# Patient Record
Sex: FEMALE | Race: WHITE | NOT HISPANIC OR LATINO | Employment: OTHER | URBAN - METROPOLITAN AREA
[De-identification: names, ages, dates, MRNs, and addresses within clinical notes are randomized per-mention and may not be internally consistent; named-entity substitution may affect disease eponyms.]

---

## 2017-06-02 ENCOUNTER — OFFICE VISIT (OUTPATIENT)
Dept: URGENT CARE | Facility: CLINIC | Age: 42
End: 2017-06-02
Payer: COMMERCIAL

## 2017-06-02 VITALS
OXYGEN SATURATION: 99 % | BODY MASS INDEX: 22.87 KG/M2 | HEIGHT: 69 IN | TEMPERATURE: 98.1 F | SYSTOLIC BLOOD PRESSURE: 118 MMHG | RESPIRATION RATE: 16 BRPM | HEART RATE: 72 BPM | WEIGHT: 154.4 LBS | DIASTOLIC BLOOD PRESSURE: 76 MMHG

## 2017-06-02 DIAGNOSIS — S23.41XA RIB SPRAIN, INITIAL ENCOUNTER: ICD-10-CM

## 2017-06-02 PROCEDURE — 99214 OFFICE O/P EST MOD 30 MIN: CPT | Performed by: FAMILY MEDICINE

## 2017-06-02 RX ORDER — KETOROLAC TROMETHAMINE 30 MG/ML
60 INJECTION, SOLUTION INTRAMUSCULAR; INTRAVENOUS ONCE
Status: COMPLETED | OUTPATIENT
Start: 2017-06-02 | End: 2017-06-02

## 2017-06-02 RX ORDER — CYCLOBENZAPRINE HCL 10 MG
10 TABLET ORAL 2 TIMES DAILY PRN
Qty: 14 TAB | Refills: 0 | Status: SHIPPED | OUTPATIENT
Start: 2017-06-02 | End: 2019-03-04

## 2017-06-02 RX ADMIN — KETOROLAC TROMETHAMINE 60 MG: 30 INJECTION, SOLUTION INTRAMUSCULAR; INTRAVENOUS at 09:31

## 2017-06-02 ASSESSMENT — ENCOUNTER SYMPTOMS
VOMITING: 0
NAUSEA: 1
FEVER: 0
CHILLS: 0

## 2017-06-02 NOTE — MR AVS SNAPSHOT
"        Jenelle Clark   2017 8:45 AM   Office Visit   MRN: [id number]    Department:  [facility]   Dept Phone:  [phone]    Description:  Female : 1975   Provider:  Hai Connor M.D.           Reason for Visit     Rib Injury x 2 days ago started having pain in R ribs, hurts to breathe, no known injury       Allergies as of 2017     No Known Allergies      You were diagnosed with     Rib sprain, initial encounter   [[id number]]         Vital Signs     Blood Pressure Pulse Temperature Respirations Height Weight    118/76 mmHg 72 36.7 °C (98.1 °F) 16 1.753 m (5' 9\") 70.035 kg (154 lb 6.4 oz)    Body Mass Index Oxygen Saturation Smoking Status             22.79 kg/m2 99% Never Smoker          Basic Information     Date Of Birth Sex Race Ethnicity Preferred Language    1975 Female White Non- English      Health Maintenance        Date Due Completion Dates    IMM DTaP/Tdap/Td Vaccine (1 - Tdap) 10/8/1992 ---    PAP SMEAR 10/8/1994 ---    MAMMOGRAM 10/8/2013 ---            Current Immunizations     No immunizations on file.      Below and/or attached are the medications your provider expects you to take. Review all of your home medications and newly ordered medications with your provider and/or pharmacist. Follow medication instructions as directed by your provider and/or pharmacist. Please keep your medication list with you and share with your provider. Update the information when medications are discontinued, doses are changed, or new medications (including over-the-counter products) are added; and carry medication information at all times in the event of emergency situations     Allergies:  No Known Allergies          Medications  Valid as of: 2017 -  9:42 AM    Generic Name Brand Name Tablet Size Instructions for use    Cyclobenzaprine HCl (Tab) FLEXERIL 10 MG Take 1 Tab by mouth 2 times a day as needed. May cause drowsiness (do not operate heavy machinery).       " Ibuprofen (Tab) MOTRIN 200 MG Take 200 mg by mouth every 6 hours as needed.        .                 Medicines prescribed today were sent to:     [id number] - [place], [place] - [address] [facility]    [address] [facility] [address]    Phone: [phone] Fax: [phone]    Open 24 Hours?: No      Medication refill instructions:       If your prescription bottle indicates you have medication refills left, it is not necessary to call your provider’s office. Please contact your pharmacy and they will refill your medication.    If your prescription bottle indicates you do not have any refills left, you may request refills at any time through one of the following ways: The online Scyron system (except Urgent Care), by calling your provider’s office, or by asking your pharmacy to contact your provider’s office with a refill request. Medication refills are processed only during regular business hours and may not be available until the next business day. Your provider may request additional information or to have a follow-up visit with you prior to refilling your medication.   *Please Note: Medication refills are assigned a new Rx number when refilled electronically. Your pharmacy may indicate that no refills were authorized even though a new prescription for the same medication is available at the pharmacy. Please request the medicine by name with the pharmacy before contacting your provider for a refill.           Scyron Access Code: [email or web address]  Expires: 7/2/2017  9:42 AM    Your email address is not on file at [facility].  Email Addresses are required for you to sign up for Scyron, please contact [phone] to verify your personal information and to provide your email address prior to attempting to register for Scyron.    Jenelle Clark  [facility] [place] [address]    Scyron  A secure, online tool to manage your health information     [employer]’s Scyron® is a secure, online tool  that connects you to your personalized health information from the privacy of your home -- day or night - making it very easy for you to manage your healthcare. Once the activation process is completed, you can even access your medical information using the ngmoco carly, which is available for free in the Apple Carly store or Google Play store.     To learn more about ngmoco, visit [email or web address]    There are two levels of access available (as shown below):   My Chart Features  [facility] Doctor [facility]  Specialists [facility]  Urgent  Care Non-[facility] Primary [facility] Doctor   Email your healthcare team securely and privately 24/7 X X X    Manage appointments: schedule your next appointment; view details of past/upcoming appointments X      Request prescription refills. X      View recent personal medical records, including lab and immunizations X X X X   View health record, including health history, allergies, medications X X X X   Read reports about your outpatient visits, procedures, consult and ER notes X X X X   See your discharge summary, which is a recap of your hospital and/or ER visit that includes your diagnosis, lab results, and care plan X X  X     How to register for ngmoco:  Once your e-mail address has been verified, follow the following steps to sign up for ngmoco.     1. Go to  [email or web address]  2. Click on the Sign Up Now box, which takes you to the New Member Sign Up page. You will need to provide the following information:  a. Enter your ngmoco Access Code exactly as it appears at the top of this page. (You will not need to use this code after you’ve completed the sign-up process. If you do not sign up before the expiration date, you must request a new code.)   b. Enter your date of birth.   c. Enter your home email address.   d. Click Submit, and follow the next screen’s instructions.  3. Create a ngmoco ID. This will be your ngmoco login ID and cannot be changed, so think  of one that is secure and easy to remember.  4. Create a Savor[employer] password. You can change your password at any time.  5. Enter your Password Reset Question and Answer. This can be used at a later time if you forget your password.   6. Enter your e-mail address. This allows you to receive e-mail notifications when new information is available in Savor[employer].  7. Click Sign Up. You can now view your health information.    For assistance activating your [employer] account, call [phone]

## 2017-06-02 NOTE — PROGRESS NOTES
"Subjective:      Jenelle Clark is a 41 y.o. female who presents with Rib Injury            Rib Injury  This is a new problem. The current episode started in the past 7 days (3 days). The problem occurs constantly. The problem has been gradually worsening. Associated symptoms include nausea. Pertinent negatives include no chest pain, chills, fever or vomiting. Associated symptoms comments: Occasional nausea with bad pain. Exacerbated by: deep breathing and rotation, laying on right side. She has tried ice and NSAIDs for the symptoms. The treatment provided mild relief.       Review of Systems   Constitutional: Negative for fever and chills.   Cardiovascular: Negative for chest pain.   Gastrointestinal: Positive for nausea. Negative for vomiting.     PMH:  has no past medical history on file.  MEDS:   Current outpatient prescriptions:   •  ibuprofen (MOTRIN) 200 MG Tab, Take 200 mg by mouth every 6 hours as needed., Disp: , Rfl:   •  Carisoprodol (SOMA) 250 MG Tab, Take 1 Tab by mouth 3 times a day as needed., Disp: 20 Tab, Rfl: 0  ALLERGIES: No Known Allergies  SURGHX:   Past Surgical History   Procedure Laterality Date   • Hip arthroscopy Right    • Cardiovascular       cardiopulmonary atresia as an infant     SOCHX:  reports that she has never smoked. She does not have any smokeless tobacco history on file. She reports that she drinks alcohol. She reports that she does not use illicit drugs.  FH: Family history was reviewed, no pertinent findings to report       Objective:     /76 mmHg  Pulse 72  Temp(Src) 36.7 °C (98.1 °F)  Resp 16  Ht 1.753 m (5' 9\")  Wt 70.035 kg (154 lb 6.4 oz)  BMI 22.79 kg/m2  SpO2 99%     Physical Exam   Constitutional: She appears well-developed. No distress.   HENT:   Head: Normocephalic.   Cardiovascular: Normal rate and regular rhythm.  Exam reveals no friction rub.    Murmur heard.  Machinery murmur   Pulmonary/Chest: Effort normal. No respiratory distress. She has no " wheezes.   Abdominal: Soft. She exhibits no distension. There is no tenderness. There is no rebound and no guarding.   No organomegaly   Neurological: She is alert.   Skin: Skin is dry. No rash noted. She is not diaphoretic.   Psychiatric: She has a normal mood and affect. Her behavior is normal.     No ecchymosis  No rib deformity or crepitus noted  POSITIVE soft tissue tenderness along the eighth and ninth rib region posteriorly with some muscle spasm  Pressure deep inspiration reproduces the pain both posteriorly and anteriorly with combined pressure producing relief           Assessment/Plan:     1. Rib sprain, initial encounter  ketorolac (TORADOL) injection 60 mg    cyclobenzaprine (FLEXERIL) 10 MG Tab     Muscle spasm/rib pain  No acute injury  Slow onset  Anti-inflammatory OTC to begin taking tonight  Cyclobenzaprine at bedtime  Follow-up if symptoms worsen or fail to improve

## 2017-12-13 ENCOUNTER — HOSPITAL ENCOUNTER (OUTPATIENT)
Dept: LAB | Facility: MEDICAL CENTER | Age: 42
End: 2017-12-13
Attending: NURSE PRACTITIONER
Payer: COMMERCIAL

## 2017-12-13 LAB
25(OH)D3 SERPL-MCNC: 34 NG/ML (ref 30–100)
ALBUMIN SERPL BCP-MCNC: 4 G/DL (ref 3.2–4.9)
ALBUMIN/GLOB SERPL: 1.6 G/DL
ALP SERPL-CCNC: 54 U/L (ref 30–99)
ALT SERPL-CCNC: 15 U/L (ref 2–50)
ANION GAP SERPL CALC-SCNC: 6 MMOL/L (ref 0–11.9)
AST SERPL-CCNC: 16 U/L (ref 12–45)
BASOPHILS # BLD AUTO: 0.3 % (ref 0–1.8)
BASOPHILS # BLD: 0.02 K/UL (ref 0–0.12)
BILIRUB SERPL-MCNC: 0.4 MG/DL (ref 0.1–1.5)
BUN SERPL-MCNC: 16 MG/DL (ref 8–22)
CALCIUM SERPL-MCNC: 9.1 MG/DL (ref 8.5–10.5)
CHLORIDE SERPL-SCNC: 103 MMOL/L (ref 96–112)
CO2 SERPL-SCNC: 28 MMOL/L (ref 20–33)
CREAT SERPL-MCNC: 1.03 MG/DL (ref 0.5–1.4)
EOSINOPHIL # BLD AUTO: 0.04 K/UL (ref 0–0.51)
EOSINOPHIL NFR BLD: 0.6 % (ref 0–6.9)
ERYTHROCYTE [DISTWIDTH] IN BLOOD BY AUTOMATED COUNT: 42.7 FL (ref 35.9–50)
EST. AVERAGE GLUCOSE BLD GHB EST-MCNC: 100 MG/DL
GFR SERPL CREATININE-BSD FRML MDRD: 58 ML/MIN/1.73 M 2
GLOBULIN SER CALC-MCNC: 2.5 G/DL (ref 1.9–3.5)
GLUCOSE SERPL-MCNC: 87 MG/DL (ref 65–99)
HBA1C MFR BLD: 5.1 % (ref 0–5.6)
HCT VFR BLD AUTO: 44.7 % (ref 37–47)
HGB BLD-MCNC: 14.8 G/DL (ref 12–16)
IMM GRANULOCYTES # BLD AUTO: 0.01 K/UL (ref 0–0.11)
IMM GRANULOCYTES NFR BLD AUTO: 0.2 % (ref 0–0.9)
LYMPHOCYTES # BLD AUTO: 1.88 K/UL (ref 1–4.8)
LYMPHOCYTES NFR BLD: 29.4 % (ref 22–41)
MCH RBC QN AUTO: 30.9 PG (ref 27–33)
MCHC RBC AUTO-ENTMCNC: 33.1 G/DL (ref 33.6–35)
MCV RBC AUTO: 93.3 FL (ref 81.4–97.8)
MONOCYTES # BLD AUTO: 0.72 K/UL (ref 0–0.85)
MONOCYTES NFR BLD AUTO: 11.3 % (ref 0–13.4)
NEUTROPHILS # BLD AUTO: 3.73 K/UL (ref 2–7.15)
NEUTROPHILS NFR BLD: 58.2 % (ref 44–72)
NRBC # BLD AUTO: 0 K/UL
NRBC BLD AUTO-RTO: 0 /100 WBC
PLATELET # BLD AUTO: 292 K/UL (ref 164–446)
PMV BLD AUTO: 10.9 FL (ref 9–12.9)
POTASSIUM SERPL-SCNC: 4.7 MMOL/L (ref 3.6–5.5)
PROT SERPL-MCNC: 6.5 G/DL (ref 6–8.2)
RBC # BLD AUTO: 4.79 M/UL (ref 4.2–5.4)
SODIUM SERPL-SCNC: 137 MMOL/L (ref 135–145)
T4 FREE SERPL-MCNC: 0.75 NG/DL (ref 0.53–1.43)
TSH SERPL DL<=0.005 MIU/L-ACNC: 1.43 UIU/ML (ref 0.3–3.7)
WBC # BLD AUTO: 6.4 K/UL (ref 4.8–10.8)

## 2017-12-13 PROCEDURE — 84443 ASSAY THYROID STIM HORMONE: CPT

## 2017-12-13 PROCEDURE — 82306 VITAMIN D 25 HYDROXY: CPT

## 2017-12-13 PROCEDURE — 83036 HEMOGLOBIN GLYCOSYLATED A1C: CPT

## 2017-12-13 PROCEDURE — 84439 ASSAY OF FREE THYROXINE: CPT

## 2017-12-13 PROCEDURE — 36415 COLL VENOUS BLD VENIPUNCTURE: CPT

## 2017-12-13 PROCEDURE — 80053 COMPREHEN METABOLIC PANEL: CPT

## 2017-12-13 PROCEDURE — 85025 COMPLETE CBC W/AUTO DIFF WBC: CPT

## 2019-03-01 ENCOUNTER — TELEPHONE (OUTPATIENT)
Dept: SCHEDULING | Facility: IMAGING CENTER | Age: 44
End: 2019-03-01

## 2019-03-04 ENCOUNTER — OFFICE VISIT (OUTPATIENT)
Dept: INTERNAL MEDICINE | Facility: MEDICAL CENTER | Age: 44
End: 2019-03-04
Payer: COMMERCIAL

## 2019-03-04 VITALS
DIASTOLIC BLOOD PRESSURE: 76 MMHG | SYSTOLIC BLOOD PRESSURE: 120 MMHG | BODY MASS INDEX: 23.34 KG/M2 | TEMPERATURE: 97.1 F | HEIGHT: 68 IN | OXYGEN SATURATION: 97 % | WEIGHT: 154 LBS | HEART RATE: 73 BPM

## 2019-03-04 DIAGNOSIS — M16.11 OSTEOARTHRITIS OF RIGHT HIP, UNSPECIFIED OSTEOARTHRITIS TYPE: ICD-10-CM

## 2019-03-04 DIAGNOSIS — Q24.9 CONGENITAL HEART DEFECT: ICD-10-CM

## 2019-03-04 DIAGNOSIS — F12.90 MARIJUANA USE: ICD-10-CM

## 2019-03-04 DIAGNOSIS — Z01.818 PRE-OP EVALUATION: ICD-10-CM

## 2019-03-04 PROCEDURE — 99204 OFFICE O/P NEW MOD 45 MIN: CPT | Mod: GC | Performed by: INTERNAL MEDICINE

## 2019-03-04 RX ORDER — TRAMADOL HYDROCHLORIDE 50 MG/1
50 TABLET ORAL
Refills: 0 | COMMUNITY
Start: 2019-01-16 | End: 2024-02-28

## 2019-03-04 ASSESSMENT — PATIENT HEALTH QUESTIONNAIRE - PHQ9: CLINICAL INTERPRETATION OF PHQ2 SCORE: 0

## 2019-03-04 NOTE — LETTER
[facility]  Juanita Shine M.D.  [address]  [place] [address]  Fax: [phone]   Authorization for Release/Disclosure of   Protected Health Information   Name: JENELLE WINTER : 1975 SSN: [id number]   Address: [address] [place] [address]  [place] [address] Phone:    [phone] (home) [phone] (work)   I authorize the entity listed below to release/disclose the PHI below to:   [facility]/Juanita Shine M.D. and Juanita Shine M.D.   Provider or Entity Name: Dr Farzana Chowdhury     Address   City, [facility]   Phone:      Fax:     Reason for request: continuity of care   Information to be released:    [  ] LAST COLONOSCOPY,  including any PATH REPORT and follow-up  [  ] LAST FIT/COLOGUARD RESULT [  ] LAST DEXA  [  ] LAST MAMMOGRAM  [ X ] LAST PAP  [  ] LAST LABS [  ] RETINA EXAM REPORT  [  ] IMMUNIZATION RECORDS  [ X ] Release all info      [  ] Check here and initial the line next to each item to release ALL health information INCLUDING  _____ Care and treatment for drug and / or alcohol abuse  _____ HIV testing, infection status, or AIDS  _____ Genetic Testing    DATES OF SERVICE OR TIME PERIOD TO BE DISCLOSED: _____________  I understand and acknowledge that:  * This Authorization may be revoked at any time by you in writing, except if your health information has already been used or disclosed.  * Your health information that will be used or disclosed as a result of you signing this authorization could be re-disclosed by the recipient. If this occurs, your re-disclosed health information may no longer be protected by State or Federal laws.  * You may refuse to sign this Authorization. Your refusal will not affect your ability to obtain treatment.  * This Authorization becomes effective upon signing and will  on (date) __________.      If no date is indicated, this Authorization will  one (1) year from the signature date.    Name: Jenelle Clark  Navjot    Signature:   Date:     3/4/2019       PLEASE FAX REQUESTED RECORDS BACK TO: [phone]

## 2019-03-04 NOTE — PROGRESS NOTES
New Patient to Establish    Reason to establish: New patient to establish    CC: Preop clearance, right hip pain.    HPI: Ms. Clark is a 43-year-old female here today to establish as a new patient and requesting preop evaluation.   She was diagnosed with severe right hip osteoarthritis, 5 years ago she had an arthroscopy done.  Her pain had been getting worse and she was seen orthopedic surgery and recommended to have a right total hip replacement.   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 She was an athlete growing up, did soft ball, cross country and did not have any issues. She is able to climb 4-5 flights of stairs although her pain in hip limits that now. She was pregnant in 2010, saw a cardiologist ([facility] ) prior to being pregnant and they did possibly a stress test which was normal.  She had an uneventful pregnancy and delivered a healthy child in January.  She did not have any episodes of chest pain, palpitations, shortness of breath, dizziness or weakness.  She had general anesthesia for hip arthroscopy about 5 years ago without complications.  She denies increased bleeding, family history of bleeding disorders.  No past medical history of MI, diabetes, CKD, PAD, anxiety, depression, thyroid issues.  Other than heart surgery she had endometrial ablation, no menstrual periods since then.  She was a never smoker, uses about 3 glasses of wine most days of the week, uses marijuana daily for pain.    Patient Active Problem List    Diagnosis Date Noted   • Osteoarthritis of right hip 03/04/2019   • Marijuana use 03/04/2019   • Congenital heart defect 03/04/2019       No past medical history on file.    Current Outpatient Prescriptions   Medication Sig Dispense Refill   • tramadol (ULTRAM) 50 MG Tab Take 50 mg by mouth every evening.  0   • Multiple Vitamins-Minerals (MULTIVITAMIN PO)  Take  by mouth every day.     • ibuprofen (MOTRIN) 200 MG Tab Take 200 mg by mouth every 6 hours as needed.       No current facility-administered medications for this visit.        Allergies as of 03/04/2019   • (No Known Allergies)       Social History     Social History   • Marital status:      Spouse name: N/A   • Number of children: N/A   • Years of education: N/A     Occupational History   • Not on file.     Social History Main Topics   • Smoking status: Never Smoker   • Smokeless tobacco: Never Used   • Alcohol use 1.8 oz/week     3 Glasses of wine per week      Comment: glass of wine most days of the week   • Drug use: Yes     Types: Marijuana      Comment: For leg pain.    • Sexual activity: Not on file     Other Topics Concern   • Not on file     Social History Narrative   • No narrative on file       Family History   Problem Relation Age of Onset   • Arthritis Mother         OA   • Stroke Father         TIA    • Arrythmia Father    • Stroke Maternal Grandfather    • Stroke Paternal Grandmother        Past Surgical History:   Procedure Laterality Date   • CARDIOVASCULAR      cardiopulmonary atresia as an infant at day 1 and at 1 years of age.    • ENDOMETRIAL ABLATION     • HIP ARTHROSCOPY Right        ROS: As per HPI. Additional pertinent systems as noted below.  Constitutional: Negative for chills and fever.   HENT: Negative for ear pain and sore throat.    Eyes: Negative for discharge and redness.   Respiratory: Negative for cough, hemoptysis, wheezing and stridor.    Cardiovascular: Negative for chest pain, palpitations and leg swelling.   Gastrointestinal: Negative for abdominal pain, constipation, diarrhea, heartburn, nausea and vomiting.   Genitourinary: Negative for dysuria, flank pain and hematuria.   Musculoskeletal: Negative for falls and myalgias.   Skin: Negative for itching and rash.   Neurological: Negative for dizziness, seizures, loss of consciousness and headaches.  "  Endo/Heme/Allergies: Negative for polydipsia. Does not bruise/bleed easily.   Psychiatric/Behavioral: Negative for substance abuse and suicidal ideas.       /76 (BP Location: Right arm, Patient Position: Sitting, BP Cuff Size: Adult)   Pulse 73   Temp 36.2 °C (97.1 °F) (Temporal)   Ht 1.715 m (5' 7.5\")   Wt 69.9 kg (154 lb)   SpO2 97%   BMI 23.76 kg/m²     Physical Exam  General:  Alert and oriented, No apparent distress.    Eyes: Pupils equal and reactive. No scleral icterus.    Throat: Clear no erythema or exudates noted.    Neck: Supple. No lymphadenopathy noted. Thyroid not enlarged.    Midline surgical scar in the chest.    Lungs: Clear to auscultation and percussion bilaterally.    Cardiovascular: Regular rate and rhythm.  Systolic murmur best heard in pulmonary area, split second heart sound.    Abdomen:  Benign. No rebound or guarding noted.    Extremities: No clubbing, cyanosis, edema.    Skin: Clear. No rash or suspicious skin lesions noted.  Gait: Antalgic.     Note: I have reviewed all pertinent labs and diagnostic tests associated with this visit with specific comments listed under the assessment and plan below    Assessment and Plan  1. Osteoarthritis of right hip, unspecified osteoarthritis type  Followed by orthopedic surgery, currently on tramadol for hip pain.  Plan for right total hip replacement.    2. Pre-op evaluation  We will order lab work.    Functional capacity 4-10 M ETS  Since she has a history of congenital heart disease needing surgery and audible murmur we will get an echocardiogram and refer to cardiology for clearance prior to surgery.     - CBC WITH DIFFERENTIAL; Future  - Comp Metabolic Panel; Future  - Lipid Profile; Future  - EC-ECHOCARDIOGRAM COMPLETE W/O CONT; Future  - REFERRAL TO CARDIOLOGY    3. Marijuana use  Using daily due to right hip pain.    4. 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  Surgery done at [facility].  Previously evaluated 10 years ago prior to pregnancy.  Currently asymptomatic.  - REFERRAL TO CARDIOLOGY    Followup: Return in about 6 months (around 9/4/2019).  Please note that this dictation was created using voice recognition software. I have made every reasonable attempt to correct obvious errors, but I expect that there are errors of grammar and possibly content that I did not discover before finalizing the note.    Signed by: Juanita Shine M.D.

## 2019-03-20 ENCOUNTER — HOSPITAL ENCOUNTER (OUTPATIENT)
Dept: CARDIOLOGY | Facility: MEDICAL CENTER | Age: 44
End: 2019-03-20
Attending: INTERNAL MEDICINE
Payer: COMMERCIAL

## 2019-03-20 DIAGNOSIS — Z01.818 PRE-OP EVALUATION: ICD-10-CM

## 2019-03-20 LAB
LV EJECT FRACT  99904: 60
LV EJECT FRACT MOD 2C 99903: 52.88
LV EJECT FRACT MOD 4C 99902: 57.36
LV EJECT FRACT MOD BP 99901: 59.06

## 2019-03-20 PROCEDURE — 93306 TTE W/DOPPLER COMPLETE: CPT | Mod: 26 | Performed by: INTERNAL MEDICINE

## 2019-03-20 PROCEDURE — 93306 TTE W/DOPPLER COMPLETE: CPT

## 2019-03-22 ENCOUNTER — OFFICE VISIT (OUTPATIENT)
Dept: CARDIOLOGY | Facility: MEDICAL CENTER | Age: 44
End: 2019-03-22
Payer: COMMERCIAL

## 2019-03-22 VITALS
HEART RATE: 62 BPM | DIASTOLIC BLOOD PRESSURE: 70 MMHG | WEIGHT: 154 LBS | HEIGHT: 68 IN | BODY MASS INDEX: 23.34 KG/M2 | OXYGEN SATURATION: 98 % | SYSTOLIC BLOOD PRESSURE: 100 MMHG

## 2019-03-22 DIAGNOSIS — Z13.220 SCREENING FOR LIPID DISORDERS: ICD-10-CM

## 2019-03-22 DIAGNOSIS — Z01.810 PREOP CARDIOVASCULAR EXAM: ICD-10-CM

## 2019-03-22 PROBLEM — Q24.9 CONGENITAL HEART DEFECT: Chronic | Status: ACTIVE | Noted: 2019-03-22

## 2019-03-22 LAB — EKG IMPRESSION: NORMAL

## 2019-03-22 PROCEDURE — 93000 ELECTROCARDIOGRAM COMPLETE: CPT | Performed by: INTERNAL MEDICINE

## 2019-03-22 PROCEDURE — 99244 OFF/OP CNSLTJ NEW/EST MOD 40: CPT | Mod: 25 | Performed by: INTERNAL MEDICINE

## 2019-03-22 ASSESSMENT — ENCOUNTER SYMPTOMS
NAUSEA: 0
BLURRED VISION: 0
SORE THROAT: 0
PND: 0
FALLS: 0
CHILLS: 0
CLAUDICATION: 0
BRUISES/BLEEDS EASILY: 0
PALPITATIONS: 0
DIZZINESS: 0
FEVER: 0
WEAKNESS: 0
FOCAL WEAKNESS: 0
ABDOMINAL PAIN: 0
COUGH: 0
SHORTNESS OF BREATH: 0

## 2019-03-22 NOTE — LETTER
[facility]   [address]   [address]  [place] [address]  Phone: [phone]  Fax: [phone]              Jenelle Morfin  1975    Encounter Date: 3/22/2019    Antione Taylor M.D.          PROGRESS NOTE:  Chief Complaint   Patient presents with   • Preoperative CV Eval       Subjective:   Jenelle Morfin is a 43 y.o. female who presents today in consultation from Dr. 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    • ENDOMETRIAL ABLATION     • HIP ARTHROSCOPY Right      Family History   Problem Relation Age of Onset   • Arthritis Mother         OA   • Stroke Father         TIA    • Arrythmia Father    • Stroke Maternal Grandfather    • Stroke Paternal Grandmother      Social History     Social History   • Marital status:      Spouse name: N/A   • Number of children: N/A   • Years of education: N/A     Occupational History   • Not on file.     Social History Main Topics   • Smoking status: Never Smoker   • Smokeless tobacco: Never Used   • Alcohol use 1.8 oz/week     3  "Glasses of wine per week      Comment: glass of wine most days of the week   • Drug use: Yes     Types: Marijuana      Comment: For leg pain.    • Sexual activity: Not on file     Other Topics Concern   • Not on file     Social History Narrative   • No narrative on file     No Known Allergies  Outpatient Encounter Prescriptions as of 3/22/2019   Medication Sig Dispense Refill   • tramadol (ULTRAM) 50 MG Tab Take 50 mg by mouth every evening.  0   • Multiple Vitamins-Minerals (MULTIVITAMIN PO) Take  by mouth every day.     • ibuprofen (MOTRIN) 200 MG Tab Take 200 mg by mouth every 6 hours as needed.       No facility-administered encounter medications on file as of 3/22/2019.      Review of Systems   Constitutional: Negative for chills and fever.   HENT: Negative for sore throat.    Eyes: Negative for blurred vision.   Respiratory: Negative for cough and shortness of breath.    Cardiovascular: Negative for chest pain, palpitations, claudication, leg swelling and PND.   Gastrointestinal: Negative for abdominal pain and nausea.   Musculoskeletal: Positive for joint pain. Negative for falls.   Skin: Negative for rash.   Neurological: Negative for dizziness, focal weakness and weakness.   Endo/Heme/Allergies: Does not bruise/bleed easily.        Objective:   /70 (BP Location: Right arm, Patient Position: Sitting)   Pulse 62   Ht 1.727 m (5' 8\")   Wt 69.9 kg (154 lb)   SpO2 98%   BMI 23.42 kg/m²      Physical Exam   Constitutional: No distress.   HENT:   Mouth/Throat: Oropharynx is clear and moist. No oropharyngeal exudate.   Eyes: No scleral icterus.   Neck: No JVD present.   Cardiovascular: Normal rate and normal heart sounds.  Exam reveals no gallop and no friction rub.    No murmur heard.  Pulmonary/Chest: No respiratory distress. She has no wheezes. She has no rales.   Abdominal: Soft. Bowel sounds are normal.   Musculoskeletal: She exhibits no edema.   Neurological: She is alert.   Skin: No rash noted. " She is not diaphoretic.   Psychiatric: She has a normal mood and affect.     We reviewed the images of her echocardiogram from this week  CONCLUSIONS  No prior study is available for comparison.   The RV is tethered to the anterior wall consistent with a post   operative state.  There is a mild gradient across the pulmonic valve, possible prior   pulmonic stenosis repair.  Left ventricular ejection fraction is visually estimated to be 60%.  Abnormal septal motion consistent with right ventricular (RV) volume   overload and/or elevated RV end-diastolic pressure.  Right ventricular systolic pressure is estimated to be 40 mmHg.    Labs from 2017 were normal she will be having preop labs    Results for orders placed or performed in visit on 19   EKG   Result Value Ref Range    Report       [facility] Cardiology [facility]    Test Date:  2019  Pt Name:    JOSE MORFIN       Department: [facility]  MRN:        [id number]                      Room:  Gender:     Female                       Technician:   :        1975                   Requested By:FABI HOPKINS  Order #:    [id number]                    Reading MD: Fabi Hopkins MD    Measurements  Intervals                                Axis  Rate:       56                           P:          72  KS:         184                          QRS:        112  QRSD:       98                           T:          -13  QT:         424  QTc:        410    Interpretive Statements  SINUS BRADYCARDIA  RIGHT ATRIAL ABNORMALITY  RIGHT AXIS DEVIATION    Compared to the EKG from [facility] 2008 no significant change.    Electronically Signed On 3- 13:18:37 PDT by Fabi Hopkins MD         Assessment:     1. Preop cardiovascular exam  EKG   2. Screening for lipid disorders  Lipid Profile       Medical Decision Making:  Today's Assessment / Status / Plan:     It was my pleasure to meet with Ms. Amber Morfin.    She is  accompanied by her      for history of pulmonary atresia this was evaluated 2008 and appears the same now in 2019 with no limitations she will be normal cardiovascular risk for general anesthesia and quite acceptable she does not require any further testing    Her EKG well which show right axis deviation    She is encouraged to get a screening lipid profile    Normally she should follow-up every 5-10 years with cardiology sooner for any symptoms    It is my pleasure to participate in the care of Ms. Amber Morfin.  Please do not hesitate to contact me with questions or concerns. [facility] Cardiology is available 24/7 for consultative services at [phone] in the perioperative period.    Electronically Signed    Antione Taylor MD PhD [facility]  Cardiologist [facility] for [facility]    Please note that this dictation was created using voice recognition software. I have worked with consultants from the vendor as well as technical experts from [facility] to optimize the interface. I have made every reasonable attempt to correct obvious errors, but I expect that there are errors of grammar and possibly content I did not discover before finalizing the note.           Juanita Shine M.D.  [address]  [place] [address]  [place] [address]  VIA In Basket     Silvia Ann M.D.  [address]  [place] [address]  VIA Facsimile: [phone]

## 2019-03-22 NOTE — LETTER
PROCEDURE/SURGERY CLEARANCE FORM      Encounter Date: 3/22/2019    Patient: Jenelle Morfin  YOB: 1975    CARDIOLOGIST:  Antione Taylor M.D.    REFERRING DOCTOR:  Dr Ann        The above patient is cleared to have the following procedure/surgery: hip surgery                                           It is my pleasure to participate in the care of Ms. Amber Morfin.  Please do not hesitate to contact me with questions or concerns. [facility] Cardiology is available 24/7 for consultative services at [phone] in the perioperative period.    Electronically Signed    Antione Taylor MD PhD [facility]  Cardiologist [facility] for Heart and Vascular Health    Please note that this dictation was created using voice recognition software. I have worked with consultants from the vendor as well as technical experts from [facility] to optimize the interface. I have made every reasonable attempt to correct obvious errors, but I expect that there are errors of grammar and possibly content I did not discover before finalizing the note.

## 2019-03-22 NOTE — PROGRESS NOTES
Chief Complaint   Patient presents with   • Preoperative CV Eval       Subjective:   Jenelle Morfin is a 43 y.o. female who presents today in consultation from Dr. 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    • ENDOMETRIAL ABLATION     • HIP ARTHROSCOPY Right      Family History   Problem Relation Age of Onset   • Arthritis Mother         OA   • Stroke Father         TIA    • Arrythmia Father    • Stroke Maternal Grandfather    • Stroke Paternal Grandmother      Social History     Social History   • Marital status:      Spouse name: N/A   • Number of children: N/A   • Years of education: N/A     Occupational History   • Not on file.     Social History Main Topics   • Smoking status: Never Smoker   • Smokeless tobacco: Never Used   • Alcohol use 1.8 oz/week     3 Glasses of wine per week      Comment: glass of wine most days of the week   • Drug use: Yes     Types: Marijuana      Comment: For leg pain.    • Sexual activity: Not on file     Other Topics Concern   • Not on file     Social History Narrative   • No narrative on file     No Known  "Allergies  Outpatient Encounter Prescriptions as of 3/22/2019   Medication Sig Dispense Refill   • tramadol (ULTRAM) 50 MG Tab Take 50 mg by mouth every evening.  0   • Multiple Vitamins-Minerals (MULTIVITAMIN PO) Take  by mouth every day.     • ibuprofen (MOTRIN) 200 MG Tab Take 200 mg by mouth every 6 hours as needed.       No facility-administered encounter medications on file as of 3/22/2019.      Review of Systems   Constitutional: Negative for chills and fever.   HENT: Negative for sore throat.    Eyes: Negative for blurred vision.   Respiratory: Negative for cough and shortness of breath.    Cardiovascular: Negative for chest pain, palpitations, claudication, leg swelling and PND.   Gastrointestinal: Negative for abdominal pain and nausea.   Musculoskeletal: Positive for joint pain. Negative for falls.   Skin: Negative for rash.   Neurological: Negative for dizziness, focal weakness and weakness.   Endo/Heme/Allergies: Does not bruise/bleed easily.        Objective:   /70 (BP Location: Right arm, Patient Position: Sitting)   Pulse 62   Ht 1.727 m (5' 8\")   Wt 69.9 kg (154 lb)   SpO2 98%   BMI 23.42 kg/m²      Physical Exam   Constitutional: No distress.   HENT:   Mouth/Throat: Oropharynx is clear and moist. No oropharyngeal exudate.   Eyes: No scleral icterus.   Neck: No JVD present.   Cardiovascular: Normal rate and normal heart sounds.  Exam reveals no gallop and no friction rub.    No murmur heard.  Pulmonary/Chest: No respiratory distress. She has no wheezes. She has no rales.   Abdominal: Soft. Bowel sounds are normal.   Musculoskeletal: She exhibits no edema.   Neurological: She is alert.   Skin: No rash noted. She is not diaphoretic.   Psychiatric: She has a normal mood and affect.     We reviewed the images of her echocardiogram from this week  CONCLUSIONS  No prior study is available for comparison.   The RV is tethered to the anterior wall consistent with a post   operative state.  There " is a mild gradient across the pulmonic valve, possible prior   pulmonic stenosis repair.  Left ventricular ejection fraction is visually estimated to be 60%.  Abnormal septal motion consistent with right ventricular (RV) volume   overload and/or elevated RV end-diastolic pressure.  Right ventricular systolic pressure is estimated to be 40 mmHg.    Labs from 2017 were normal she will be having preop labs    Results for orders placed or performed in visit on 19   EKG   Result Value Ref Range    Report       [facility] Cardiology [facility]    Test Date:  2019  Pt Name:    JOSE MORFIN       Department: [facility]  MRN:        [id number]                      Room:  Gender:     Female                       Technician: KAY  :        1975                   Requested By:ANTIONE HOPKINS  Order #:    [id number]                    Reading MD: Antione Hopkins MD    Measurements  Intervals                                Axis  Rate:       56                           P:          72  MT:         184                          QRS:        112  QRSD:       98                           T:          -13  QT:         424  QTc:        410    Interpretive Statements  SINUS BRADYCARDIA  RIGHT ATRIAL ABNORMALITY  RIGHT AXIS DEVIATION    Compared to the EKG from [facility]  no significant change.    Electronically Signed On 3- 13:18:37 PDT by Antione Hopkins MD         Assessment:     1. Preop cardiovascular exam  EKG   2. Screening for lipid disorders  Lipid Profile       Medical Decision Making:  Today's Assessment / Status / Plan:     It was my pleasure to meet with Ms. Amber Morfin.    She is accompanied by her      for history of pulmonary atresia this was evaluated 2008 and appears the same now in 2019 with no limitations she will be normal cardiovascular risk for general anesthesia and quite acceptable she does not require any further testing    Her EKG well which show  right axis deviation    She is encouraged to get a screening lipid profile    Normally she should follow-up every 5-10 years with cardiology sooner for any symptoms    It is my pleasure to participate in the care of Ms. Amber Morfin.  Please do not hesitate to contact me with questions or concerns. [facility] Cardiology is available 24/7 for consultative services at [phone] in the perioperative period.    Electronically Signed    Antione Taylor MD PhD FAC  Cardiologist [facility] for Heart and Vascular Health    Please note that this dictation was created using voice recognition software. I have worked with consultants from the vendor as well as technical experts from [facility] to optimize the interface. I have made every reasonable attempt to correct obvious errors, but I expect that there are errors of grammar and possibly content I did not discover before finalizing the note.

## 2019-03-26 ENCOUNTER — TELEPHONE (OUTPATIENT)
Dept: INTERNAL MEDICINE | Facility: MEDICAL CENTER | Age: 44
End: 2019-03-26

## 2019-03-26 NOTE — TELEPHONE ENCOUNTER
Pt has been cleared by cardiology for orthopedic surgery, not having labs until 5 days prior per [facility]. Pt need surgery clearance from PCP as well. I'll fax letter if you write.

## 2019-04-03 NOTE — PROGRESS NOTES
Addendum to Progress note    Pre-op evaluation for Right total hip replacement.   -reviewed cardiology consult note by Dr Taylor: Per the note she will be normal cardiovascular risk for general anesthesia.   -reviewed labs done on 04/01/2019   BUN 14 creatinine 0.87, Hb 14.6, platelets 303.     - Revised cardiac risk index for preoperative risk score is 0 - 3.9% risk of 30 day mortality, MI or cardiac arrest  - Recommend discontinuation of ibuprogen one week prior to surgery.

## 2019-04-03 NOTE — TELEPHONE ENCOUNTER
I have made an addendum to previous note from 03/04 after reviewing the cardiology note and labs. Please send both the progress note and the addendum (after attending signature) to the orthopedic surgeons office. Thanks.

## 2019-04-10 DIAGNOSIS — Z01.812 PRE-PROCEDURAL LABORATORY EXAMINATION: ICD-10-CM

## 2019-04-10 LAB
ANION GAP SERPL CALC-SCNC: 12 MMOL/L (ref 0–11.9)
APTT PPP: 32.8 SEC (ref 24.7–36)
BASOPHILS # BLD AUTO: 0.3 % (ref 0–1.8)
BASOPHILS # BLD: 0.03 K/UL (ref 0–0.12)
BUN SERPL-MCNC: 16 MG/DL (ref 8–22)
CALCIUM SERPL-MCNC: 9.5 MG/DL (ref 8.5–10.5)
CHLORIDE SERPL-SCNC: 104 MMOL/L (ref 96–112)
CO2 SERPL-SCNC: 23 MMOL/L (ref 20–33)
CREAT SERPL-MCNC: 0.86 MG/DL (ref 0.5–1.4)
EOSINOPHIL # BLD AUTO: 0.03 K/UL (ref 0–0.51)
EOSINOPHIL NFR BLD: 0.3 % (ref 0–6.9)
ERYTHROCYTE [DISTWIDTH] IN BLOOD BY AUTOMATED COUNT: 39.7 FL (ref 35.9–50)
EST. AVERAGE GLUCOSE BLD GHB EST-MCNC: 105 MG/DL
GLUCOSE SERPL-MCNC: 75 MG/DL (ref 65–99)
HBA1C MFR BLD: 5.3 % (ref 0–5.6)
HCG SERPL QL: NEGATIVE
HCT VFR BLD AUTO: 46.5 % (ref 37–47)
HGB BLD-MCNC: 15.2 G/DL (ref 12–16)
HIV 1+2 AB+HIV1 P24 AG SERPL QL IA: NON REACTIVE
IMM GRANULOCYTES # BLD AUTO: 0.01 K/UL (ref 0–0.11)
IMM GRANULOCYTES NFR BLD AUTO: 0.1 % (ref 0–0.9)
INR PPP: 1.24 (ref 0.87–1.13)
LYMPHOCYTES # BLD AUTO: 3.7 K/UL (ref 1–4.8)
LYMPHOCYTES NFR BLD: 42.6 % (ref 22–41)
MCH RBC QN AUTO: 29.8 PG (ref 27–33)
MCHC RBC AUTO-ENTMCNC: 32.7 G/DL (ref 33.6–35)
MCV RBC AUTO: 91.2 FL (ref 81.4–97.8)
MONOCYTES # BLD AUTO: 0.63 K/UL (ref 0–0.85)
MONOCYTES NFR BLD AUTO: 7.3 % (ref 0–13.4)
NEUTROPHILS # BLD AUTO: 4.28 K/UL (ref 2–7.15)
NEUTROPHILS NFR BLD: 49.4 % (ref 44–72)
NRBC # BLD AUTO: 0 K/UL
NRBC BLD-RTO: 0 /100 WBC
PLATELET # BLD AUTO: 240 K/UL (ref 164–446)
PMV BLD AUTO: 10.6 FL (ref 9–12.9)
POTASSIUM SERPL-SCNC: 3.8 MMOL/L (ref 3.6–5.5)
PROTHROMBIN TIME: 15.7 SEC (ref 12–14.6)
RBC # BLD AUTO: 5.1 M/UL (ref 4.2–5.4)
SODIUM SERPL-SCNC: 139 MMOL/L (ref 135–145)
WBC # BLD AUTO: 8.7 K/UL (ref 4.8–10.8)

## 2019-04-10 PROCEDURE — 85730 THROMBOPLASTIN TIME PARTIAL: CPT

## 2019-04-10 PROCEDURE — 83036 HEMOGLOBIN GLYCOSYLATED A1C: CPT

## 2019-04-10 PROCEDURE — 85610 PROTHROMBIN TIME: CPT

## 2019-04-10 PROCEDURE — 87389 HIV-1 AG W/HIV-1&-2 AB AG IA: CPT

## 2019-04-10 PROCEDURE — 87641 MR-STAPH DNA AMP PROBE: CPT

## 2019-04-10 PROCEDURE — 36415 COLL VENOUS BLD VENIPUNCTURE: CPT

## 2019-04-10 PROCEDURE — 85025 COMPLETE CBC W/AUTO DIFF WBC: CPT

## 2019-04-10 PROCEDURE — 87640 STAPH A DNA AMP PROBE: CPT

## 2019-04-10 PROCEDURE — 80048 BASIC METABOLIC PNL TOTAL CA: CPT

## 2019-04-10 PROCEDURE — 84703 CHORIONIC GONADOTROPIN ASSAY: CPT

## 2019-04-10 NOTE — OR NURSING
During pre admit appointment today pt denied having symptoms of burning with urination or urinary frequency, UA not indicated.  Pt verbalized an understanding that if the nasal swab done today is positive Dr. Ann's office will notify her.

## 2019-04-10 NOTE — DISCHARGE PLANNING
DISCHARGE PLANNING NOTE - TOTAL JOINT     Procedure: Procedure(s):  ARTHROPLASTY, HIP, TOTAL  Procedure Date: 4/12/2019  Insurance:  Payor: ACCESS TO HEALTHCARE / Plan: [facility] TIER 1 / Product Type: *No Product type* /   Equipment currently available at home? front-wheel walker, raised toilet seat and shower chair  Steps into the home? 2  Steps within the home? 0  Toilet height? Standard  Type of shower? walk-in shower  Who will be with you during your recovery? spouse  Is Outpatient Physical Therapy set up after surgery? Yes  Did you take the Total Joint Class and where? Yes, at [facility]     Plan: Anticipate Home.

## 2019-04-12 ENCOUNTER — APPOINTMENT (OUTPATIENT)
Dept: RADIOLOGY | Facility: MEDICAL CENTER | Age: 44
DRG: 470 | End: 2019-04-12
Attending: ORTHOPAEDIC SURGERY
Payer: COMMERCIAL

## 2019-04-12 ENCOUNTER — ANESTHESIA EVENT (OUTPATIENT)
Dept: SURGERY | Facility: MEDICAL CENTER | Age: 44
DRG: 470 | End: 2019-04-12
Payer: COMMERCIAL

## 2019-04-12 ENCOUNTER — ANESTHESIA (OUTPATIENT)
Dept: SURGERY | Facility: MEDICAL CENTER | Age: 44
DRG: 470 | End: 2019-04-12
Payer: COMMERCIAL

## 2019-04-12 ENCOUNTER — HOSPITAL ENCOUNTER (INPATIENT)
Facility: MEDICAL CENTER | Age: 44
LOS: 1 days | DRG: 470 | End: 2019-04-13
Attending: ORTHOPAEDIC SURGERY | Admitting: ORTHOPAEDIC SURGERY
Payer: COMMERCIAL

## 2019-04-12 DIAGNOSIS — G89.18 POST-OP PAIN: ICD-10-CM

## 2019-04-12 DIAGNOSIS — M16.7 OTHER SECONDARY OSTEOARTHRITIS OF RIGHT HIP: ICD-10-CM

## 2019-04-12 LAB
SCCMEC + MECA PNL NOSE NAA+PROBE: NORMAL
SCCMEC + MECA PNL NOSE NAA+PROBE: NORMAL

## 2019-04-12 PROCEDURE — 770001 HCHG ROOM/CARE - MED/SURG/GYN PRIV*

## 2019-04-12 PROCEDURE — 160031 HCHG SURGERY MINUTES - 1ST 30 MINS LEVEL 5: Performed by: ORTHOPAEDIC SURGERY

## 2019-04-12 PROCEDURE — 72170 X-RAY EXAM OF PELVIS: CPT

## 2019-04-12 PROCEDURE — 160048 HCHG OR STATISTICAL LEVEL 1-5: Performed by: ORTHOPAEDIC SURGERY

## 2019-04-12 PROCEDURE — 0SR906A REPLACEMENT OF RIGHT HIP JOINT WITH OXIDIZED ZIRCONIUM ON POLYETHYLENE SYNTHETIC SUBSTITUTE, UNCEMENTED, OPEN APPROACH: ICD-10-PCS | Performed by: ORTHOPAEDIC SURGERY

## 2019-04-12 PROCEDURE — 501838 HCHG SUTURE GENERAL: Performed by: ORTHOPAEDIC SURGERY

## 2019-04-12 PROCEDURE — 700101 HCHG RX REV CODE 250

## 2019-04-12 PROCEDURE — 700111 HCHG RX REV CODE 636 W/ 250 OVERRIDE (IP): Performed by: ANESTHESIOLOGY

## 2019-04-12 PROCEDURE — 700102 HCHG RX REV CODE 250 W/ 637 OVERRIDE(OP): Performed by: ANESTHESIOLOGY

## 2019-04-12 PROCEDURE — 700111 HCHG RX REV CODE 636 W/ 250 OVERRIDE (IP)

## 2019-04-12 PROCEDURE — 700101 HCHG RX REV CODE 250: Performed by: ANESTHESIOLOGY

## 2019-04-12 PROCEDURE — 160009 HCHG ANES TIME/MIN: Performed by: ORTHOPAEDIC SURGERY

## 2019-04-12 PROCEDURE — 160042 HCHG SURGERY MINUTES - EA ADDL 1 MIN LEVEL 5: Performed by: ORTHOPAEDIC SURGERY

## 2019-04-12 PROCEDURE — 97165 OT EVAL LOW COMPLEX 30 MIN: CPT

## 2019-04-12 PROCEDURE — 700101 HCHG RX REV CODE 250: Performed by: ORTHOPAEDIC SURGERY

## 2019-04-12 PROCEDURE — 700111 HCHG RX REV CODE 636 W/ 250 OVERRIDE (IP): Performed by: ORTHOPAEDIC SURGERY

## 2019-04-12 PROCEDURE — 160036 HCHG PACU - EA ADDL 30 MINS PHASE I: Performed by: ORTHOPAEDIC SURGERY

## 2019-04-12 PROCEDURE — 97535 SELF CARE MNGMENT TRAINING: CPT

## 2019-04-12 PROCEDURE — 700105 HCHG RX REV CODE 258

## 2019-04-12 PROCEDURE — A9270 NON-COVERED ITEM OR SERVICE: HCPCS | Performed by: ANESTHESIOLOGY

## 2019-04-12 PROCEDURE — 502779 HCHG SUTURE, QUILL: Performed by: ORTHOPAEDIC SURGERY

## 2019-04-12 PROCEDURE — A9270 NON-COVERED ITEM OR SERVICE: HCPCS | Performed by: PHYSICIAN ASSISTANT

## 2019-04-12 PROCEDURE — 502578 HCHG PACK, TOTAL HIP: Performed by: ORTHOPAEDIC SURGERY

## 2019-04-12 PROCEDURE — 502000 HCHG MISC OR IMPLANTS RC 0278: Performed by: ORTHOPAEDIC SURGERY

## 2019-04-12 PROCEDURE — 700112 HCHG RX REV CODE 229: Performed by: PHYSICIAN ASSISTANT

## 2019-04-12 PROCEDURE — 160035 HCHG PACU - 1ST 60 MINS PHASE I: Performed by: ORTHOPAEDIC SURGERY

## 2019-04-12 PROCEDURE — 700111 HCHG RX REV CODE 636 W/ 250 OVERRIDE (IP): Performed by: PHYSICIAN ASSISTANT

## 2019-04-12 PROCEDURE — A9270 NON-COVERED ITEM OR SERVICE: HCPCS | Performed by: ORTHOPAEDIC SURGERY

## 2019-04-12 PROCEDURE — 97161 PT EVAL LOW COMPLEX 20 MIN: CPT

## 2019-04-12 PROCEDURE — 700102 HCHG RX REV CODE 250 W/ 637 OVERRIDE(OP): Performed by: PHYSICIAN ASSISTANT

## 2019-04-12 PROCEDURE — 700101 HCHG RX REV CODE 250: Performed by: PHYSICIAN ASSISTANT

## 2019-04-12 PROCEDURE — 700102 HCHG RX REV CODE 250 W/ 637 OVERRIDE(OP): Performed by: ORTHOPAEDIC SURGERY

## 2019-04-12 PROCEDURE — 160002 HCHG RECOVERY MINUTES (STAT): Performed by: ORTHOPAEDIC SURGERY

## 2019-04-12 DEVICE — IMPLANT REF SPHER HEAD SCREW 30MM (1EA): Type: IMPLANTABLE DEVICE | Site: HIP | Status: FUNCTIONAL

## 2019-04-12 DEVICE — IMPLANT OXINIUM FEM HD 12/14 36 MM +0 (1EA): Type: IMPLANTABLE DEVICE | Site: HIP | Status: FUNCTIONAL

## 2019-04-12 DEVICE — IMPLANT R3 3 HOLE ACET SHELL 52MM (1EA): Type: IMPLANTABLE DEVICE | Site: HIP | Status: FUNCTIONAL

## 2019-04-12 DEVICE — IMPLANT REF SPHER HEAD SCREW 25MM (1EA): Type: IMPLANTABLE DEVICE | Site: HIP | Status: FUNCTIONAL

## 2019-04-12 DEVICE — IMPLANTABLE DEVICE: Type: IMPLANTABLE DEVICE | Site: HIP | Status: FUNCTIONAL

## 2019-04-12 DEVICE — IMPLANT R3 20 DEG XLPE ACET LNR 36MM X 52MM: Type: IMPLANTABLE DEVICE | Site: HIP | Status: FUNCTIONAL

## 2019-04-12 DEVICE — STEM POLAR CEMENTLESS STANDARD TI/HA 2: Type: IMPLANTABLE DEVICE | Site: HIP | Status: FUNCTIONAL

## 2019-04-12 RX ORDER — DEXAMETHASONE SODIUM PHOSPHATE 4 MG/ML
8 INJECTION, SOLUTION INTRA-ARTICULAR; INTRALESIONAL; INTRAMUSCULAR; INTRAVENOUS; SOFT TISSUE ONCE
Status: COMPLETED | OUTPATIENT
Start: 2019-04-13 | End: 2019-04-13

## 2019-04-12 RX ORDER — OXYCODONE HCL 5 MG/5 ML
5 SOLUTION, ORAL ORAL
Status: COMPLETED | OUTPATIENT
Start: 2019-04-12 | End: 2019-04-12

## 2019-04-12 RX ORDER — PSEUDOEPHEDRINE HCL 30 MG
100 TABLET ORAL 2 TIMES DAILY
Qty: 60 CAP | Refills: 0
Start: 2019-04-12 | End: 2024-02-28

## 2019-04-12 RX ORDER — AMOXICILLIN 250 MG
1 CAPSULE ORAL
Status: DISCONTINUED | OUTPATIENT
Start: 2019-04-12 | End: 2019-04-13 | Stop reason: HOSPADM

## 2019-04-12 RX ORDER — HYDROMORPHONE HYDROCHLORIDE 1 MG/ML
0.2 INJECTION, SOLUTION INTRAMUSCULAR; INTRAVENOUS; SUBCUTANEOUS
Status: DISCONTINUED | OUTPATIENT
Start: 2019-04-12 | End: 2019-04-12 | Stop reason: HOSPADM

## 2019-04-12 RX ORDER — SODIUM CHLORIDE 9 MG/ML
INJECTION, SOLUTION INTRAVENOUS
Status: COMPLETED
Start: 2019-04-12 | End: 2019-04-12

## 2019-04-12 RX ORDER — SODIUM CHLORIDE, SODIUM LACTATE, POTASSIUM CHLORIDE, CALCIUM CHLORIDE 600; 310; 30; 20 MG/100ML; MG/100ML; MG/100ML; MG/100ML
INJECTION, SOLUTION INTRAVENOUS CONTINUOUS
Status: ACTIVE | OUTPATIENT
Start: 2019-04-12 | End: 2019-04-12

## 2019-04-12 RX ORDER — DIPHENHYDRAMINE HCL 25 MG
25 TABLET ORAL NIGHTLY PRN
Status: DISCONTINUED | OUTPATIENT
Start: 2019-04-13 | End: 2019-04-13 | Stop reason: HOSPADM

## 2019-04-12 RX ORDER — CYCLOBENZAPRINE HCL 10 MG
10 TABLET ORAL 2 TIMES DAILY PRN
Qty: 50 TAB | Refills: 0 | Status: SHIPPED | OUTPATIENT
Start: 2019-04-12 | End: 2024-02-28

## 2019-04-12 RX ORDER — OXYCODONE HCL 5 MG/5 ML
10 SOLUTION, ORAL ORAL
Status: COMPLETED | OUTPATIENT
Start: 2019-04-12 | End: 2019-04-12

## 2019-04-12 RX ORDER — TRANEXAMIC ACID 100 MG/ML
INJECTION, SOLUTION INTRAVENOUS
Status: DISCONTINUED | OUTPATIENT
Start: 2019-04-12 | End: 2019-04-12 | Stop reason: HOSPADM

## 2019-04-12 RX ORDER — CEFAZOLIN SODIUM 2 G/100ML
2 INJECTION, SOLUTION INTRAVENOUS ONCE
Status: DISCONTINUED | OUTPATIENT
Start: 2019-04-12 | End: 2019-04-12 | Stop reason: HOSPADM

## 2019-04-12 RX ORDER — DIPHENHYDRAMINE HYDROCHLORIDE 50 MG/ML
12.5 INJECTION INTRAMUSCULAR; INTRAVENOUS
Status: DISCONTINUED | OUTPATIENT
Start: 2019-04-12 | End: 2019-04-12 | Stop reason: HOSPADM

## 2019-04-12 RX ORDER — TAMSULOSIN HYDROCHLORIDE 0.4 MG/1
0.4 CAPSULE ORAL
Status: DISCONTINUED | OUTPATIENT
Start: 2019-04-12 | End: 2019-04-13 | Stop reason: HOSPADM

## 2019-04-12 RX ORDER — OXYCODONE HYDROCHLORIDE 5 MG/1
5 TABLET ORAL
Status: DISCONTINUED | OUTPATIENT
Start: 2019-04-12 | End: 2019-04-13 | Stop reason: HOSPADM

## 2019-04-12 RX ORDER — CYCLOBENZAPRINE HCL 10 MG
10 TABLET ORAL 2 TIMES DAILY PRN
Status: DISCONTINUED | OUTPATIENT
Start: 2019-04-12 | End: 2019-04-13 | Stop reason: HOSPADM

## 2019-04-12 RX ORDER — DIPHENHYDRAMINE HCL 25 MG
25 TABLET ORAL EVERY 6 HOURS PRN
Status: DISCONTINUED | OUTPATIENT
Start: 2019-04-12 | End: 2019-04-13 | Stop reason: HOSPADM

## 2019-04-12 RX ORDER — POLYETHYLENE GLYCOL 3350 17 G/17G
1 POWDER, FOR SOLUTION ORAL 2 TIMES DAILY PRN
Status: DISCONTINUED | OUTPATIENT
Start: 2019-04-12 | End: 2019-04-13 | Stop reason: HOSPADM

## 2019-04-12 RX ORDER — METOCLOPRAMIDE HYDROCHLORIDE 5 MG/ML
INJECTION INTRAMUSCULAR; INTRAVENOUS PRN
Status: DISCONTINUED | OUTPATIENT
Start: 2019-04-12 | End: 2019-04-12 | Stop reason: SURG

## 2019-04-12 RX ORDER — HYDROMORPHONE HYDROCHLORIDE 1 MG/ML
0.4 INJECTION, SOLUTION INTRAMUSCULAR; INTRAVENOUS; SUBCUTANEOUS
Status: DISCONTINUED | OUTPATIENT
Start: 2019-04-12 | End: 2019-04-12 | Stop reason: HOSPADM

## 2019-04-12 RX ORDER — ONDANSETRON 4 MG/1
4 TABLET, ORALLY DISINTEGRATING ORAL EVERY 4 HOURS PRN
Qty: 10 TAB | Refills: 0
Start: 2019-04-12 | End: 2024-02-28

## 2019-04-12 RX ORDER — LORAZEPAM 2 MG/ML
0.5 INJECTION INTRAMUSCULAR
Status: DISCONTINUED | OUTPATIENT
Start: 2019-04-12 | End: 2019-04-12 | Stop reason: HOSPADM

## 2019-04-12 RX ORDER — TRAMADOL HYDROCHLORIDE 50 MG/1
50 TABLET ORAL EVERY 4 HOURS PRN
Status: DISCONTINUED | OUTPATIENT
Start: 2019-04-12 | End: 2019-04-13 | Stop reason: HOSPADM

## 2019-04-12 RX ORDER — CHLORPROMAZINE HYDROCHLORIDE 25 MG/ML
25 INJECTION INTRAMUSCULAR EVERY 6 HOURS PRN
Status: DISCONTINUED | OUTPATIENT
Start: 2019-04-12 | End: 2019-04-13 | Stop reason: HOSPADM

## 2019-04-12 RX ORDER — DOCUSATE SODIUM 100 MG/1
100 CAPSULE, LIQUID FILLED ORAL 2 TIMES DAILY
Status: DISCONTINUED | OUTPATIENT
Start: 2019-04-12 | End: 2019-04-13 | Stop reason: HOSPADM

## 2019-04-12 RX ORDER — HYDROMORPHONE HYDROCHLORIDE 2 MG/ML
INJECTION, SOLUTION INTRAMUSCULAR; INTRAVENOUS; SUBCUTANEOUS PRN
Status: DISCONTINUED | OUTPATIENT
Start: 2019-04-12 | End: 2019-04-12 | Stop reason: SURG

## 2019-04-12 RX ORDER — ONDANSETRON 4 MG/1
4 TABLET, ORALLY DISINTEGRATING ORAL EVERY 4 HOURS PRN
Status: DISCONTINUED | OUTPATIENT
Start: 2019-04-12 | End: 2019-04-13 | Stop reason: HOSPADM

## 2019-04-12 RX ORDER — DIPHENHYDRAMINE HYDROCHLORIDE 50 MG/ML
25 INJECTION INTRAMUSCULAR; INTRAVENOUS EVERY 6 HOURS PRN
Status: DISCONTINUED | OUTPATIENT
Start: 2019-04-12 | End: 2019-04-13 | Stop reason: HOSPADM

## 2019-04-12 RX ORDER — ACETAMINOPHEN 325 MG/1
650 TABLET ORAL EVERY 6 HOURS
Status: DISCONTINUED | OUTPATIENT
Start: 2019-04-12 | End: 2019-04-13 | Stop reason: HOSPADM

## 2019-04-12 RX ORDER — TRANEXAMIC ACID 100 MG/ML
INJECTION, SOLUTION INTRAVENOUS PRN
Status: DISCONTINUED | OUTPATIENT
Start: 2019-04-12 | End: 2019-04-12 | Stop reason: HOSPADM

## 2019-04-12 RX ORDER — DEXAMETHASONE SODIUM PHOSPHATE 4 MG/ML
INJECTION, SOLUTION INTRA-ARTICULAR; INTRALESIONAL; INTRAMUSCULAR; INTRAVENOUS; SOFT TISSUE PRN
Status: DISCONTINUED | OUTPATIENT
Start: 2019-04-12 | End: 2019-04-12 | Stop reason: SURG

## 2019-04-12 RX ORDER — SODIUM CHLORIDE, SODIUM LACTATE, POTASSIUM CHLORIDE, CALCIUM CHLORIDE 600; 310; 30; 20 MG/100ML; MG/100ML; MG/100ML; MG/100ML
1000 INJECTION, SOLUTION INTRAVENOUS CONTINUOUS
Status: CANCELLED | OUTPATIENT
Start: 2019-04-12 | End: 2019-04-12

## 2019-04-12 RX ORDER — DEXTROSE MONOHYDRATE, SODIUM CHLORIDE, AND POTASSIUM CHLORIDE 50; 1.49; 4.5 G/1000ML; G/1000ML; G/1000ML
INJECTION, SOLUTION INTRAVENOUS CONTINUOUS
Status: DISPENSED | OUTPATIENT
Start: 2019-04-12 | End: 2019-04-13

## 2019-04-12 RX ORDER — KETOROLAC TROMETHAMINE 15 MG/ML
INJECTION, SOLUTION INTRAMUSCULAR; INTRAVENOUS
Status: DISCONTINUED | OUTPATIENT
Start: 2019-04-12 | End: 2019-04-12 | Stop reason: HOSPADM

## 2019-04-12 RX ORDER — OXYCODONE HYDROCHLORIDE 10 MG/1
10 TABLET ORAL
Status: DISCONTINUED | OUTPATIENT
Start: 2019-04-12 | End: 2019-04-13 | Stop reason: HOSPADM

## 2019-04-12 RX ORDER — HALOPERIDOL 5 MG/ML
1 INJECTION INTRAMUSCULAR EVERY 6 HOURS PRN
Status: DISCONTINUED | OUTPATIENT
Start: 2019-04-12 | End: 2019-04-13 | Stop reason: HOSPADM

## 2019-04-12 RX ORDER — OXYCODONE HYDROCHLORIDE 5 MG/1
5 TABLET ORAL
Qty: 30 TAB | Refills: 0
Start: 2019-04-12 | End: 2019-04-26

## 2019-04-12 RX ORDER — GABAPENTIN 300 MG/1
300 CAPSULE ORAL ONCE
Status: COMPLETED | OUTPATIENT
Start: 2019-04-12 | End: 2019-04-12

## 2019-04-12 RX ORDER — ACETAMINOPHEN 325 MG/1
650 TABLET ORAL EVERY 6 HOURS
Qty: 30 TAB | Refills: 0
Start: 2019-04-12 | End: 2024-02-28

## 2019-04-12 RX ORDER — HYDROMORPHONE HYDROCHLORIDE 1 MG/ML
0.1 INJECTION, SOLUTION INTRAMUSCULAR; INTRAVENOUS; SUBCUTANEOUS
Status: DISCONTINUED | OUTPATIENT
Start: 2019-04-12 | End: 2019-04-12 | Stop reason: HOSPADM

## 2019-04-12 RX ORDER — SODIUM CHLORIDE 9 MG/ML
INJECTION, SOLUTION INTRAMUSCULAR; INTRAVENOUS; SUBCUTANEOUS
Status: DISCONTINUED | OUTPATIENT
Start: 2019-04-12 | End: 2019-04-12 | Stop reason: HOSPADM

## 2019-04-12 RX ORDER — DEXAMETHASONE SODIUM PHOSPHATE 4 MG/ML
4 INJECTION, SOLUTION INTRA-ARTICULAR; INTRALESIONAL; INTRAMUSCULAR; INTRAVENOUS; SOFT TISSUE
Status: COMPLETED | OUTPATIENT
Start: 2019-04-12 | End: 2019-04-12

## 2019-04-12 RX ORDER — BISACODYL 10 MG
10 SUPPOSITORY, RECTAL RECTAL
Status: DISCONTINUED | OUTPATIENT
Start: 2019-04-12 | End: 2019-04-13 | Stop reason: HOSPADM

## 2019-04-12 RX ORDER — SCOLOPAMINE TRANSDERMAL SYSTEM 1 MG/1
1 PATCH, EXTENDED RELEASE TRANSDERMAL
Status: DISCONTINUED | OUTPATIENT
Start: 2019-04-12 | End: 2019-04-13 | Stop reason: HOSPADM

## 2019-04-12 RX ORDER — CEFAZOLIN SODIUM 2 G/100ML
2 INJECTION, SOLUTION INTRAVENOUS EVERY 8 HOURS
Status: COMPLETED | OUTPATIENT
Start: 2019-04-12 | End: 2019-04-13

## 2019-04-12 RX ORDER — ONDANSETRON 2 MG/ML
4 INJECTION INTRAMUSCULAR; INTRAVENOUS
Status: DISCONTINUED | OUTPATIENT
Start: 2019-04-12 | End: 2019-04-12 | Stop reason: HOSPADM

## 2019-04-12 RX ORDER — BUPIVACAINE HYDROCHLORIDE AND EPINEPHRINE 2.5; 5 MG/ML; UG/ML
INJECTION, SOLUTION INFILTRATION; PERINEURAL
Status: DISCONTINUED | OUTPATIENT
Start: 2019-04-12 | End: 2019-04-12 | Stop reason: HOSPADM

## 2019-04-12 RX ORDER — HYDROMORPHONE HYDROCHLORIDE 1 MG/ML
0.5 INJECTION, SOLUTION INTRAMUSCULAR; INTRAVENOUS; SUBCUTANEOUS
Status: DISCONTINUED | OUTPATIENT
Start: 2019-04-12 | End: 2019-04-13 | Stop reason: HOSPADM

## 2019-04-12 RX ORDER — ASPIRIN 81 MG/1
81 TABLET ORAL 2 TIMES DAILY
Qty: 30 TAB | Refills: 0
Start: 2019-04-12 | End: 2024-02-28

## 2019-04-12 RX ORDER — ENEMA 19; 7 G/133ML; G/133ML
1 ENEMA RECTAL
Status: DISCONTINUED | OUTPATIENT
Start: 2019-04-12 | End: 2019-04-13 | Stop reason: HOSPADM

## 2019-04-12 RX ORDER — KETOROLAC TROMETHAMINE 30 MG/ML
15 INJECTION, SOLUTION INTRAMUSCULAR; INTRAVENOUS EVERY 6 HOURS
Status: DISCONTINUED | OUTPATIENT
Start: 2019-04-12 | End: 2019-04-13 | Stop reason: HOSPADM

## 2019-04-12 RX ORDER — LIDOCAINE HYDROCHLORIDE 10 MG/ML
INJECTION, SOLUTION INFILTRATION; PERINEURAL
Status: COMPLETED
Start: 2019-04-12 | End: 2019-04-12

## 2019-04-12 RX ORDER — ACETAMINOPHEN 500 MG
1000 TABLET ORAL ONCE
Status: COMPLETED | OUTPATIENT
Start: 2019-04-12 | End: 2019-04-12

## 2019-04-12 RX ORDER — CHLORPROMAZINE HYDROCHLORIDE 25 MG/1
25 TABLET, FILM COATED ORAL EVERY 6 HOURS PRN
Status: DISCONTINUED | OUTPATIENT
Start: 2019-04-12 | End: 2019-04-13 | Stop reason: HOSPADM

## 2019-04-12 RX ORDER — METOPROLOL TARTRATE 1 MG/ML
1 INJECTION, SOLUTION INTRAVENOUS
Status: DISCONTINUED | OUTPATIENT
Start: 2019-04-12 | End: 2019-04-12 | Stop reason: HOSPADM

## 2019-04-12 RX ORDER — MAGNESIUM SULFATE HEPTAHYDRATE 40 MG/ML
INJECTION, SOLUTION INTRAVENOUS PRN
Status: DISCONTINUED | OUTPATIENT
Start: 2019-04-12 | End: 2019-04-12 | Stop reason: SURG

## 2019-04-12 RX ORDER — CELECOXIB 200 MG/1
200 CAPSULE ORAL 2 TIMES DAILY
Status: DISCONTINUED | OUTPATIENT
Start: 2019-04-13 | End: 2019-04-13 | Stop reason: HOSPADM

## 2019-04-12 RX ORDER — AMOXICILLIN 250 MG
1 CAPSULE ORAL NIGHTLY
Status: DISCONTINUED | OUTPATIENT
Start: 2019-04-12 | End: 2019-04-13 | Stop reason: HOSPADM

## 2019-04-12 RX ORDER — MEPERIDINE HYDROCHLORIDE 25 MG/ML
12.5 INJECTION INTRAMUSCULAR; INTRAVENOUS; SUBCUTANEOUS
Status: DISCONTINUED | OUTPATIENT
Start: 2019-04-12 | End: 2019-04-12 | Stop reason: HOSPADM

## 2019-04-12 RX ORDER — ONDANSETRON 2 MG/ML
4 INJECTION INTRAMUSCULAR; INTRAVENOUS EVERY 4 HOURS PRN
Status: DISCONTINUED | OUTPATIENT
Start: 2019-04-12 | End: 2019-04-13 | Stop reason: HOSPADM

## 2019-04-12 RX ORDER — CELECOXIB 200 MG/1
200 CAPSULE ORAL ONCE
Status: COMPLETED | OUTPATIENT
Start: 2019-04-12 | End: 2019-04-12

## 2019-04-12 RX ORDER — ONDANSETRON 2 MG/ML
INJECTION INTRAMUSCULAR; INTRAVENOUS PRN
Status: DISCONTINUED | OUTPATIENT
Start: 2019-04-12 | End: 2019-04-12 | Stop reason: SURG

## 2019-04-12 RX ADMIN — PROPOFOL 150 MG: 10 INJECTION, EMULSION INTRAVENOUS at 07:05

## 2019-04-12 RX ADMIN — VANCOMYCIN HYDROCHLORIDE 1000 MG: 1 INJECTION, POWDER, LYOPHILIZED, FOR SOLUTION INTRAVENOUS at 06:54

## 2019-04-12 RX ADMIN — DEXAMETHASONE SODIUM PHOSPHATE 8 MG: 4 INJECTION, SOLUTION INTRA-ARTICULAR; INTRALESIONAL; INTRAMUSCULAR; INTRAVENOUS; SOFT TISSUE at 07:21

## 2019-04-12 RX ADMIN — SODIUM CHLORIDE 500 ML: 9 INJECTION, SOLUTION INTRAVENOUS at 15:47

## 2019-04-12 RX ADMIN — TRANEXAMIC ACID 1 G: 100 INJECTION, SOLUTION INTRAVENOUS at 07:01

## 2019-04-12 RX ADMIN — METOCLOPRAMIDE 10 MG: 5 INJECTION, SOLUTION INTRAMUSCULAR; INTRAVENOUS at 07:21

## 2019-04-12 RX ADMIN — SENNOSIDES, DOCUSATE SODIUM 1 TABLET: 50; 8.6 TABLET, FILM COATED ORAL at 21:09

## 2019-04-12 RX ADMIN — HYDROMORPHONE HYDROCHLORIDE 0.5 MG: 2 INJECTION, SOLUTION INTRAMUSCULAR; INTRAVENOUS; SUBCUTANEOUS at 07:35

## 2019-04-12 RX ADMIN — CEFAZOLIN SODIUM 2 G: 2 INJECTION, SOLUTION INTRAVENOUS at 15:47

## 2019-04-12 RX ADMIN — ACETAMINOPHEN 650 MG: 325 TABLET, FILM COATED ORAL at 13:56

## 2019-04-12 RX ADMIN — MEPERIDINE HYDROCHLORIDE 12.5 MG: 25 INJECTION INTRAMUSCULAR; INTRAVENOUS; SUBCUTANEOUS at 08:46

## 2019-04-12 RX ADMIN — LIDOCAINE HYDROCHLORIDE 100 MG: 20 INJECTION, SOLUTION INTRAVENOUS at 07:05

## 2019-04-12 RX ADMIN — OXYCODONE HYDROCHLORIDE 10 MG: 10 TABLET ORAL at 15:39

## 2019-04-12 RX ADMIN — MIDAZOLAM HYDROCHLORIDE 2 MG: 1 INJECTION, SOLUTION INTRAMUSCULAR; INTRAVENOUS at 07:00

## 2019-04-12 RX ADMIN — FENTANYL CITRATE 100 MCG: 50 INJECTION, SOLUTION INTRAMUSCULAR; INTRAVENOUS at 07:01

## 2019-04-12 RX ADMIN — ONDANSETRON 4 MG: 2 INJECTION INTRAMUSCULAR; INTRAVENOUS at 07:21

## 2019-04-12 RX ADMIN — SUGAMMADEX 150 MG: 100 INJECTION, SOLUTION INTRAVENOUS at 08:24

## 2019-04-12 RX ADMIN — OXYCODONE HYDROCHLORIDE 10 MG: 10 TABLET ORAL at 18:33

## 2019-04-12 RX ADMIN — FENTANYL CITRATE 50 MCG: 50 INJECTION, SOLUTION INTRAMUSCULAR; INTRAVENOUS at 09:52

## 2019-04-12 RX ADMIN — CELECOXIB 200 MG: 200 CAPSULE ORAL at 06:24

## 2019-04-12 RX ADMIN — DIPHENHYDRAMINE HCL 25 MG: 25 TABLET ORAL at 22:41

## 2019-04-12 RX ADMIN — VANCOMYCIN HYDROCHLORIDE 1 G: 1 INJECTION, POWDER, LYOPHILIZED, FOR SOLUTION INTRAVENOUS at 07:01

## 2019-04-12 RX ADMIN — CEFAZOLIN SODIUM 2 G: 2 INJECTION, SOLUTION INTRAVENOUS at 23:34

## 2019-04-12 RX ADMIN — MAGNESIUM SULFATE IN WATER 2 G: 40 INJECTION, SOLUTION INTRAVENOUS at 07:12

## 2019-04-12 RX ADMIN — KETOROLAC TROMETHAMINE 15 MG: 30 INJECTION, SOLUTION INTRAMUSCULAR; INTRAVENOUS at 13:56

## 2019-04-12 RX ADMIN — ROCURONIUM BROMIDE 50 MG: 10 INJECTION, SOLUTION INTRAVENOUS at 07:05

## 2019-04-12 RX ADMIN — GABAPENTIN 300 MG: 300 CAPSULE ORAL at 06:24

## 2019-04-12 RX ADMIN — Medication 0.5 ML: at 06:24

## 2019-04-12 RX ADMIN — SODIUM CHLORIDE, SODIUM LACTATE, POTASSIUM CHLORIDE, CALCIUM CHLORIDE: 600; 310; 30; 20 INJECTION, SOLUTION INTRAVENOUS at 07:01

## 2019-04-12 RX ADMIN — OXYCODONE HYDROCHLORIDE 10 MG: 5 SOLUTION ORAL at 09:00

## 2019-04-12 RX ADMIN — ACETAMINOPHEN 1000 MG: 500 TABLET, FILM COATED ORAL at 06:24

## 2019-04-12 RX ADMIN — TRAMADOL HYDROCHLORIDE 50 MG: 50 TABLET, FILM COATED ORAL at 16:41

## 2019-04-12 RX ADMIN — FENTANYL CITRATE 50 MCG: 50 INJECTION, SOLUTION INTRAMUSCULAR; INTRAVENOUS at 12:46

## 2019-04-12 RX ADMIN — SODIUM CHLORIDE, SODIUM LACTATE, POTASSIUM CHLORIDE, CALCIUM CHLORIDE: 600; 310; 30; 20 INJECTION, SOLUTION INTRAVENOUS at 06:24

## 2019-04-12 RX ADMIN — LIDOCAINE HYDROCHLORIDE 0.5 ML: 10 INJECTION, SOLUTION INFILTRATION; PERINEURAL at 06:24

## 2019-04-12 RX ADMIN — ASPIRIN 81 MG: 81 TABLET, DELAYED RELEASE ORAL at 17:49

## 2019-04-12 RX ADMIN — ACETAMINOPHEN 650 MG: 325 TABLET, FILM COATED ORAL at 21:06

## 2019-04-12 RX ADMIN — KETOROLAC TROMETHAMINE 15 MG: 30 INJECTION, SOLUTION INTRAMUSCULAR; INTRAVENOUS at 21:08

## 2019-04-12 RX ADMIN — DOCUSATE SODIUM 100 MG: 100 CAPSULE, LIQUID FILLED ORAL at 17:49

## 2019-04-12 ASSESSMENT — ACTIVITIES OF DAILY LIVING (ADL): TOILETING: INDEPENDENT

## 2019-04-12 ASSESSMENT — LIFESTYLE VARIABLES
TOTAL SCORE: 0
TOTAL SCORE: 0
EVER FELT BAD OR GUILTY ABOUT YOUR DRINKING: NO
ALCOHOL_USE: YES
HAVE PEOPLE ANNOYED YOU BY CRITICIZING YOUR DRINKING: NO
HOW MANY TIMES IN THE PAST YEAR HAVE YOU HAD 5 OR MORE DRINKS IN A DAY: 0
EVER HAD A DRINK FIRST THING IN THE MORNING TO STEADY YOUR NERVES TO GET RID OF A HANGOVER: NO
HAVE YOU EVER FELT YOU SHOULD CUT DOWN ON YOUR DRINKING: NO
CONSUMPTION TOTAL: NEGATIVE
EVER_SMOKED: NEVER
TOTAL SCORE: 0
AVERAGE NUMBER OF DAYS PER WEEK YOU HAVE A DRINK CONTAINING ALCOHOL: 3
ON A TYPICAL DAY WHEN YOU DRINK ALCOHOL HOW MANY DRINKS DO YOU HAVE: 1

## 2019-04-12 ASSESSMENT — COGNITIVE AND FUNCTIONAL STATUS - GENERAL
MOVING FROM LYING ON BACK TO SITTING ON SIDE OF FLAT BED: A LITTLE
STANDING UP FROM CHAIR USING ARMS: A LITTLE
MOBILITY SCORE: 16
CLIMB 3 TO 5 STEPS WITH RAILING: A LITTLE
MOVING TO AND FROM BED TO CHAIR: UNABLE
SUGGESTED CMS G CODE MODIFIER MOBILITY: CK
HELP NEEDED FOR BATHING: A LITTLE
DRESSING REGULAR LOWER BODY CLOTHING: A LITTLE
TURNING FROM BACK TO SIDE WHILE IN FLAT BAD: A LITTLE
WALKING IN HOSPITAL ROOM: A LITTLE
SUGGESTED CMS G CODE MODIFIER DAILY ACTIVITY: CJ
DAILY ACTIVITIY SCORE: 22

## 2019-04-12 ASSESSMENT — PATIENT HEALTH QUESTIONNAIRE - PHQ9
SUM OF ALL RESPONSES TO PHQ9 QUESTIONS 1 AND 2: 0
2. FEELING DOWN, DEPRESSED, IRRITABLE, OR HOPELESS: NOT AT ALL
1. LITTLE INTEREST OR PLEASURE IN DOING THINGS: NOT AT ALL

## 2019-04-12 ASSESSMENT — GAIT ASSESSMENTS
GAIT LEVEL OF ASSIST: SUPERVISED
DISTANCE (FEET): 85
DEVIATION: ANTALGIC;DECREASED HEEL STRIKE;DECREASED TOE OFF
ASSISTIVE DEVICE: FRONT WHEEL WALKER

## 2019-04-12 ASSESSMENT — PAIN SCALES - GENERAL: PAIN_LEVEL: 4

## 2019-04-12 NOTE — ANESTHESIA QCDR
2019 [facility] Clinical Data Registry (for Quality Improvement)     Postoperative nausea/vomiting risk protocol (Adult = 18 yrs and Pediatric 3-17 yrs)- (430 and 463)  General inhalation anesthetic (NOT TIVA) with PONV risk factors: Yes  Provision of anti-emetic therapy with at least 2 different classes of agents: Yes   Patient DID NOT receive anti-emetic therapy and reason is documented in Medical Record:  N/A    Multimodal Pain Management- (AQI59)  Patient undergoing Elective Surgery (i.e. Outpatient, or ASC, or Prescheduled Surgery prior to Hospital Admission): Yes  Use of Multimodal Pain Management, two or more drugs and/or interventions, NOT including systemic opioids: Yes   Exception: Documented allergy to multiple classes of analgesics:  N/A    PACU assessment of acute postoperative pain prior to Anesthesia Care End- Applies to Patients Age = 18- (ABG7)  Initial PACU pain score is which of the following: < 7/10  Patient unable to report pain score: N/A    Post-anesthetic transfer of care checklist/protocol to PACU/ICU- (426 and 427)  Upon conclusion of case, patient transferred to which of the following locations: PACU/Non-ICU  Use of transfer checklist/protocol: Yes  Exclusion: Service Performed in Patient Hospital Room (and thus did not require transfer): N/A    PACU Reintubation- (AQI31)  General anesthesia requiring endotracheal intubation (ETT) along with subsequent extubation in OR or PACU: Yes  Required reintubation in the PACU: No   Extubation was a planned trial documented in the medical record prior to removal of the original airway device:  N/A    Unplanned admission to ICU related to anesthesia service up through end of PACU care- (MD51)  Unplanned admission to ICU (not initially anticipated at anesthesia start time): No

## 2019-04-12 NOTE — OP REPORT
Date of Surgery:  4-12-19  Pre-operative Diagnosis:  right hip arthritis    Post-operative Diagnosis:  right hip arthritis    Procedure:  Conversion of previous hip surgery to right total hip replacement    Implants used:  A Smith Nephew total hip arthroplasty system with the following components  Acetabulum: 52 mm R3 3-hole  Femur: 2 standard  Bearing: lipped XLPE polyethylene  Head: 36 mm +0 Oxinium  Screws: 2    Surgeon:  Silvia Ann MD    1st Assistant:   ZOYA Wallace    Anesthesiologist:   Vivi    EBL:  250 cc    Specimen:  none    Findings:  Severe arthritic changes,     Indications for procedure:  This patient is a 43 y.o. female with a long standing history of right hip arthritis. The patient had failed conservative therapy including anti-inflammatory medications, activity modifications, injections, physical therapy and assistive devices. She had a previous hip arthroscopy and osteoplasty, but continued to have symptoms. The patient was indicated for a right total hip replacement. The patient expressed an understanding of the risks of pain, bleeding, infection, dislocation,  need for further surgery, damage to surrounding structures, neurovascular compromise, blood clots, leg-length discrepancy both apparent and actual, anesthetic complications, and serious medical consequences including but not limited to heart attack, stroke or even death. It was explained that the implants are man-made products and thus subject to possible failure.  The patient expressed an understanding and wished to proceed. Specific risks based on the patient's medical history were addressed. A clearance was obtained by the medical physicians and the patient was taken to the operating room on the day of surgery for the above named procedure.     Description of procedure:  The patient was met in the pre-operative holding area. The right hip was marked as the appropriate surgical site with indelible ink. They were taken to the  operating room where the anesthesia department started a GETA for intraoperative and post-operative anesthesia and pain control. The patient was turned with the operative hip facing up. All bony prominences were padded appropriately. The right hip was prepped and draped in a standard sterile surgical fashion. A time out procedure was called to verify the side and site of surgery, the proposed surgical procedure, and the administration of pre-operative antibiotics. After successful completion of the time out procedure, our attention was turned to the right hip.     A straight incision was made centered on the greater trochanter. This was carried down through the subcutaneous tissue with the assistance of the Bovie electrocautery and the self-retaining retractors. The fascia was identified and cleared with a Kim elevator. This was incised in line with its fibers and the Charnley self-retaining retractor was placed. The hip was internally rotated and the external rotators were taken down. The piriformis was identified and tagged for later repair. The abductor were retracted anteriorly and protected. The posterior capsule was identified, cleared, and incised in an L-capsulotomy fashion. The corner of the L was tagged for later repair. The hip was able to be dislocated. We noted evidence of severe arthritic changes including cartilage loss and osteophytic overgrowth. The femoral neck was cleared and our neck cut was made in line with our previously templated images using a femoral neck cutting guide. The femoral head was removed, the femur was elevated out of the wound and we began our preparation of the femur, first with a box osteotome followed by a canal finder by hand then a lateralizing reamer on power. The femur was then broached to a size 2 at which point we noted excellent calcar fit and rotational stability. The broach was removed and the femoral canal was packed with a baby lap sponge. The femur was retracted  anteriorly and our attention was turned to the acetabulum.    The acetabulum was exposed and soft tissue was removed from its rim. There was eburnation of the bone and a relatively shallow acetabulum. The straight reamer was used to obtain proper medialization and then the offset reamer was used to ream up to a size 52 at which point we noted an appropriate bony bed for implantation. The acetabulum was irrigated.  A size 52 mm acetabular component was then opened in a sterile fashion and impacted into place in the proper amount of abduction and anteversion. 2 acetabular bone screws were placed for adjunctive fixation. The lipped XLPE bearing surface was inserted and impacted into place. Osteophytes from around the posterior inferior rim were removed. This was tested for proper seating and the retractors were removed and our attention was turned back to the femur.    The femur was elevated and exposed properly. The baby lap sponge was removed and the femoral canal was thoroughly irrigated. A trial stem was assembled, and we trialed both the +0 and the minus heads.  The minus head had an inappropriate soft tissue tension and stability, but with the +0 head in place we noted excellent restoration of leg length, offset and stability. The hip was+0 stable past 90 degrees of flexion and past 60 degrees of internal rotation. It did not dislocate with extension and external rotation. Abductor tension was appropriate. At this point the hip was dislocated, the trial components were removed and the femur was irrigated. The real stem was opened in a sterile fashion on the back table and then impacted into the femur. The real implant sat at the same level as the broach. Another trial reduction was made and again we noted excellent restoration of leg length, offset and stability. At this point the real head was opened and impacted onto the taper, which had been cleaned and dried thoroughly. Another reduction was made and again we  noted excellent restoration of leg length, offset and stability.    Therefore a dilute betadine solution with 3 grams of tranexamic acid was instilled for 3 minutes before being pulse-lavaged out. The posterior capsule was closed using the previously placed tag stitches. The piriformis was closed as a separate structure. The fascia was closed with #1 Vicryl in an interrupted figure of 8 fashion and then oversewn with a running #1 PDS. The deep subcutaneous tissue was closed with a running #1 PDS. The deep dermal layer was closed with 2-0 Vicryl in a buried interrupted fashion. The skin was closed with running 3-0 Monocryl and a sterile dressing was applied. The patient is currently in the operating room awaiting reversal of anesthesia. Appropriate DVT prophylaxis and perioperative antibiotics will be ordered. All sponge and instrument counts were correct prior to final wound closure.

## 2019-04-12 NOTE — OR NURSING
Patient has done well in recovery. She was medicated with Iv and oral pain medications. Her  has been updated. She is feeling good and just resting now. Belongings are on her bed     is at bedside and patient is doing really well. Xray completed. Light snack given.     1130 Pt here to work with patient     1230 Patient was given her floor dose of pain medication as it was time for her to have her second dose. She was in great deal of pain post PT. Throbbing and uncontrolled.  has been updated and she is now resting

## 2019-04-12 NOTE — THERAPY
"Occupational Therapy Evaluation completed.   Functional Status:  Min A supine>sit EOB, demonstrated application of posterior precautions, able to don/doff socks w/use of AE (sockaid and reacher) spv w/sit>Stand w/fww walking in room ambulated to bathroom completed toilet txf w/SBA and verbal cues, pt has raised toilet seat at home which will assist.   Plan of Care: Will benefit from Occupational Therapy 2 times per week  Discharge Recommendations:  Equipment: Will Continue to Assess for Equipment Needs. Post-acute therapy Anticipate that the patient will have no further occupational therapy needs after discharge from the hospital.       See \"Rehab Therapy-Acute\" Patient Summary Report for complete documentation.    43 yr old female s/p YAHIR, doing well pt is POD #0, pt was fatigued however attentive and receptive to OT education. Pt was able to apply posterior hip precautions w/vc and use of AE as needed. Pt reports good support at d/c, if pt remains in this setting may benefit from 1 more tx however anticipate pt will progress to d/c w/no needs   "

## 2019-04-12 NOTE — DISCHARGE SUMMARY
Jenelle Morfin was admitted on 4/12/2019 for UNILATERAL PRIMARY OSTEOARTHRITIS RIGHT HIP WITH PAIN  Right hip pain  Primary osteoarthritis of right hip  Right hip pain  Primary osteoarthritis of right hip  Patient was diagnosed with severe degenerative arthritis in the right hip and underwent a right total hip arthroplasty by Dr. Silvia Ann on the date of admission. Please see dictated operative note for further information.    Hospital course: standard    The patient has done well, with no complications.  Patient denies chest pain, calf pain or shortness of breath.   Pain is well-controlled at present.  Patient is ambulating well with the use of an assistive device, and progressing in physical therapy.   Patient is neurologically and vascularly intact with palpable pedal pulses bilaterally.   Narcotic dosages were chosen by taking into account the the patient's previous history of opoid use and to ensure proper pain control after surgery    Discharge date: 4-12-19    Patient is being discharged to home after am physical therapy.     Allergies:  Patient has no known allergies.       Medication List      START taking these medications      Instructions   acetaminophen 325 MG Tabs  Commonly known as:  TYLENOL   Take 2 Tabs by mouth every 6 hours.  Dose:  650 mg     aspirin 81 MG EC tablet   Take 1 Tab by mouth 2 times a day.  Dose:  81 mg     cyclobenzaprine 10 MG Tabs  Commonly known as:  FLEXERIL   Doctor's comments:  Dx: post op pain (G89.18)  Take 1 Tab by mouth 2 times a day as needed for Muscle Spasms.  Dose:  10 mg     docusate sodium 100 MG Caps   Take 100 mg by mouth 2 Times a Day.  Dose:  100 mg     ondansetron 4 MG Tbdp  Commonly known as:  ZOFRAN ODT   Take 1 Tab by mouth every four hours as needed for Nausea.  Dose:  4 mg     oxyCODONE immediate-release 5 MG Tabs  Commonly known as:  ROXICODONE   Take 1 Tab by mouth every 3 hours as needed for up to 14 days.  Dose:  5 mg        CONTINUE  taking these medications      Instructions   MULTIVITAMIN PO   Take 1 Tab by mouth every day.  Dose:  1 Tab     tramadol 50 MG Tabs  Commonly known as:  ULTRAM   Take 50 mg by mouth 1 time daily as needed.  Dose:  50 mg            Discharge Instructions:     Patient is instructed to ambulate and weight bear as tolerated with the use of an assistive device, and to continue physical therapy exercises given during this hospital stay. Strict posterior hip precautions are to be observed for patients who underwent a total hip replacement.   Patient is to ice and elevate the surgical leg regularly, with pillows under the ankle, nothing is to be placed under the knee.   Patient was given detailed wound care instructions, and will leave the silver dressing on until first post-op visit.   ASA twice daily for DVT prophylaxis.  Patient is to follow up with Dr. Ann's office in 1-2 weeks.

## 2019-04-12 NOTE — THERAPY
"Physical Therapy Evaluation completed.   Bed Mobility:  Supine to Sit: Minimal Assist  Transfers: Sit to Stand: Supervised  Gait: Level Of Assist: Supervised with Front-Wheel Walker       Plan of Care: Patient with no further skilled PT needs in the acute care setting at this time  Discharge Recommendations: Equipment: No Equipment Needed. Post-acute therapy Recommend outpatient services for continued physical therapy services.    Ms. Amber Morfin is a 42 y/o female who presents to acute secondary to R YAHIR with posterior approach. She presents with significant R hip musculature weakness and pain that results in altered gait pattern and dynamic balance deficits. FWW remedied balance impairments. Cues for heel toe steps were provided to normalize gait sequence. No LOB when ambulating. Pt only required assist with bed mobility which she reports her spouse can assist with. Provided patient with handout regarding total hip precautions and standard YAHIR therex. Reviewed both, pt demonstrated good understanding. Is eager to discharge home today. Recommend outpatient PT to address strength deficits and return patient to her desired level of physical activity.    See \"Rehab Therapy-Acute\" Patient Summary Report for complete documentation.     "

## 2019-04-12 NOTE — ANESTHESIA POSTPROCEDURE EVALUATION
Patient: Jenelle Morfin    Procedure Summary     Date:  04/12/19 Room / Location:  [facility] OR 09 / SURGERY [facility]    Anesthesia Start:  0701 Anesthesia Stop:  0843    Procedure:  ARTHROPLASTY, HIP, TOTAL (Right Hip) Diagnosis:  (UNILATERAL PRIMARY OSTEOARTHRITIS RIGHT HIP WITH PAIN)    Surgeon:  Silvia Ann M.D. Responsible Provider:  Markos Trinidad M.D.    Anesthesia Type:  general ASA Status:  2          Final Anesthesia Type: general  Last vitals  BP   Blood Pressure: 120/88, NIBP: 104/64    Temp   36.5 °C (97.7 °F)    Pulse   Pulse: 83, Heart Rate (Monitored): 85   Resp   (!) 25    SpO2   98 %      Anesthesia Post Evaluation    Patient location during evaluation: PACU  Patient participation: complete - patient participated  Level of consciousness: awake and alert  Pain score: 4    Airway patency: patent  Anesthetic complications: no  Cardiovascular status: hemodynamically stable  Respiratory status: acceptable  Hydration status: euvolemic    PONV: none           Nurse Pain Score: 4 (NPRS)

## 2019-04-12 NOTE — ANESTHESIA TIME REPORT
Anesthesia Start and Stop Event Times     Date Time Event    4/12/2019 0701 Anesthesia Start     0843 Anesthesia Stop        Responsible Staff  04/12/19    Name Role Begin End    Markos Trinidad M.D. Anesth 0701 0843        Preop Diagnosis (Free Text):  Pre-op Diagnosis     UNILATERAL PRIMARY OSTEOARTHRITIS RIGHT HIP WITH PAIN        Preop Diagnosis (Codes):  Diagnosis Information     Diagnosis Code(s):         Post op Diagnosis  Primary osteoarthritis of right hip      Premium Reason  Non-Premium    Comments: R YAHIR

## 2019-04-12 NOTE — PROGRESS NOTES
S:  s/p right YAHIR  Cleared by PT  Pain controlled  No N/V  No Chest Pain/SOB  Afebrile  Exam:    NAD A& O x3    RLE: +DF/PF/EHL SILT L4/L5/S1  LLE:  +DF/PF/EHL SILT L4/L5/S1    Plan:  Continue standard plan of care  Weight bearing: as tolerated with walker/cane and strict posterior hip precautions  DVT prophylaxis: SCD/Teds + ASA  Dispo: home today; patient requesting muscle relaxant, will rx Flexeril 10 mg PO bid prn spasms, warned not to mix with narcotics

## 2019-04-12 NOTE — ANESTHESIA PROCEDURE NOTES
Airway  Date/Time: 4/12/2019 7:09 AM  Performed by: VESNA VELAZCO  Authorized by: VESNA VELAZCO     Location:  OR  Urgency:  Elective  Indications for Airway Management:  Anesthesia  Spontaneous Ventilation: absent    Sedation Level:  Deep  Preoxygenated: Yes    Patient Position:  Sniffing  MILS Maintained Throughout: Yes    Mask Difficulty Assessment:  1 - vent by mask  Final Airway Type:  Endotracheal airway  Final Endotracheal Airway:  ETT  Cuffed: Yes    Technique Used for Successful ETT Placement:  Direct laryngoscopy  Insertion Site:  Oral  Blade Type:  Carri  Laryngoscope Blade/Videolaryngoscope Blade Size:  4  ETT Size (mm):  7.0  Measured from:  Lips  ETT to Lips (cm):  22  Placement Verified by: auscultation and capnometry    Cormack-Lehane Classification:  Grade IIa - partial view of glottis  Number of Attempts at Approach:  1  Number of Other Approaches Attempted:  0

## 2019-04-12 NOTE — PROGRESS NOTES
Report received from PACU RN. Assumed care of pt.  A/O x4. VSS. Responds appropriately. Denies pain, SOB. Assessment complete, dressing to right hip cdi, prevena in place. Explained importance of calling before getting OOB. Call light and belongings within reach.. Bed in the lowest position. Treaded socks in place. Hourly rounding in progress.

## 2019-04-12 NOTE — ANESTHESIA PREPROCEDURE EVALUATION
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 residual mild pulmonary stenosis and enlarged right ventricle status post right heart catheterization in 2008   (+) Marijuana use   (+) Osteoarthritis of right hip      Within Normal Limits   ANESTHESIA       Physical Exam    Airway   Mallampati: I  TM distance: >3 FB  Neck ROM: full       Cardiovascular - normal exam  Rhythm: regular  Rate: normal  (-) murmur     Dental - normal exam         Pulmonary - normal exam  Breath sounds clear to auscultation     Abdominal    Neurological - normal exam                 Anesthesia Plan    ASA 2       Plan - general       Airway plan will be ETT      Plan Factors:   Patient was not previously instructed to abstain from smoking on day of procedure.  Patient did not smoke on day of procedure.    Induction: intravenous    Postoperative Plan: Postoperative administration of opioids is intended.    Pertinent diagnostic labs and testing reviewed    Informed Consent:    Anesthetic plan and risks discussed with patient.    Use of blood products discussed with: patient whom consented to blood products.

## 2019-04-12 NOTE — CARE PLAN
Problem: Safety  Goal: Free from accidental injury  Outcome: PROGRESSING AS EXPECTED  Treaded socks in place, bed in the lowest position, call light and belongings within reach, pt call for assistance appropriately    Problem: Risk for Impaired Mobility  Goal: Mobilization  Outcome: PROGRESSING AS EXPECTED  Pt. up to bathroom, standby assist with FWW, WBAT.

## 2019-04-13 VITALS
HEIGHT: 68 IN | WEIGHT: 153 LBS | SYSTOLIC BLOOD PRESSURE: 108 MMHG | HEART RATE: 75 BPM | OXYGEN SATURATION: 99 % | TEMPERATURE: 98.2 F | BODY MASS INDEX: 23.19 KG/M2 | RESPIRATION RATE: 17 BRPM | DIASTOLIC BLOOD PRESSURE: 70 MMHG

## 2019-04-13 LAB
HCT VFR BLD AUTO: 35.3 % (ref 37–47)
HGB BLD-MCNC: 11.5 G/DL (ref 12–16)

## 2019-04-13 PROCEDURE — 700102 HCHG RX REV CODE 250 W/ 637 OVERRIDE(OP): Performed by: PHYSICIAN ASSISTANT

## 2019-04-13 PROCEDURE — 85018 HEMOGLOBIN: CPT

## 2019-04-13 PROCEDURE — 700111 HCHG RX REV CODE 636 W/ 250 OVERRIDE (IP): Performed by: PHYSICIAN ASSISTANT

## 2019-04-13 PROCEDURE — 36415 COLL VENOUS BLD VENIPUNCTURE: CPT

## 2019-04-13 PROCEDURE — 700112 HCHG RX REV CODE 229: Performed by: PHYSICIAN ASSISTANT

## 2019-04-13 PROCEDURE — A9270 NON-COVERED ITEM OR SERVICE: HCPCS | Performed by: PHYSICIAN ASSISTANT

## 2019-04-13 PROCEDURE — 85014 HEMATOCRIT: CPT

## 2019-04-13 RX ADMIN — KETOROLAC TROMETHAMINE 15 MG: 30 INJECTION, SOLUTION INTRAMUSCULAR; INTRAVENOUS at 05:40

## 2019-04-13 RX ADMIN — ACETAMINOPHEN 650 MG: 325 TABLET, FILM COATED ORAL at 05:25

## 2019-04-13 RX ADMIN — ASPIRIN 81 MG: 81 TABLET, DELAYED RELEASE ORAL at 05:25

## 2019-04-13 RX ADMIN — OXYCODONE HYDROCHLORIDE 10 MG: 10 TABLET ORAL at 02:44

## 2019-04-13 RX ADMIN — DOCUSATE SODIUM 100 MG: 100 CAPSULE, LIQUID FILLED ORAL at 05:25

## 2019-04-13 RX ADMIN — DEXAMETHASONE SODIUM PHOSPHATE 8 MG: 4 INJECTION, SOLUTION INTRAMUSCULAR; INTRAVENOUS at 05:27

## 2019-04-13 NOTE — CARE PLAN
Problem: Safety  Goal: Will remain free from injury    Intervention: Provide assistance with mobility  Patient will call before getting up.      Problem: Pain Management  Goal: Pain level will decrease to patient's comfort goal    Intervention: Follow pain managment plan developed in collaboration with patient and Interdisciplinary Team  Patient is aware of pain management interventions available.

## 2019-04-13 NOTE — DISCHARGE INSTRUCTIONS
Discharge Instructions    Discharged to home by car with relative. Discharged via wheelchair, hospital escort: Yes.  Special equipment needed: Not Applicable    Be sure to schedule a follow-up appointment with your primary care doctor or any specialists as instructed.     Discharge Instructions:   The first week after your joint replacement is a time to recover from the surgery. We expect light exercise to keep you active and mobile. Dr. Ann requires a walker or cane for most patients in the first few days following surgery to try to prevent falls or complications.     Most patients are prescribed two medications for pain control: a narcotic such as Oxycodone or Hydrocodone and a milder medication called Tramadol. These can be alternated for pain control, and the priority is to decrease use of the stronger narcotic as soon as tolerated.   Take your pain medication as appropriate to ensure that your pain is not limiting your recovery. You'll be seen in clinic in 7-10 days (this appointment has already been made, call our office if you're unsure of the time). At that time, we'll prescribe your physical therapy to help with the recovery phase.     -Keep dressing clean and dry. Leave dressing in place until follow up. If you have an incisional vac dressing, the battery may die before your first post operative appointment, but you can leave the surgical dressing in place and it will be removed at your clinic visit. The battery of the dressing may die, and the sponge will inflate because it is no longer holding suction. You can still leave the dressing in place and it will be removed at the office. Call the office if you notice drainage from the surgical dressing.     -OK to shower, keep dressing in place. Pat dressing dry, do not rub incision     -Do not soak incision in bath, hot tub or pool     -Follow up with Dr. Ann at regularly scheduled time     -Call [facility] office at [phone] for questions     -Weight  bearing status: as tolerated with walker/cane for ambulation and strict posterior hip precautions     -Take medication as prescribed for DVT prophylaxis     Discharge Plan:   Influenza Vaccine Indication: Patient Refuses    I understand that a diet low in cholesterol, fat, and sodium is recommended for good health. Unless I have been given specific instructions below for another diet, I accept this instruction as my diet prescription.   Other diet: Regular diet    Special Instructions:     Discharge instructions for the Orthopedic Patient    Follow up with Primary Care Physician within 2 weeks of discharge to home, regarding:  Review of medications and diagnostic testing.  Surveillance for medical complications.  Workup and treatment of osteoporosis, if appropriate.     -Is this a Joint Replacement patient? Yes   Total Joint Hip Replacement Discharge Instructions    Pain  - The goal is to slowly wean off the prescription pain medicine.  - Ice can be used for pain control.  20 minutes at a time is recommended, and never directly against your skin or incision.  - Most patients are off the pain pills by 3 weeks; others may require a low level of pain medications for many months. If your pain continues to be severe, follow up with your physician.  Infection  Deep hip joint infections that require removal of the prostheses occur in less than 0.1% of patients. Lesser infections in the skin (cellulites) are more common and much more easily treated.  - Keep the incision as clean and dry as possible.  - Always wash your hands before touching your incision.  - Skin infections tend to develop around 7-10 days after surgery, most can be treated with oral antibiotics.  - Dental Care should be delayed for 3 months after surgery, your surgeon recommends taking a dose of antibiotics 1 hour prior to any dental procedure.  After 2 years, most surgeons recommend antibiotics only before an extensive procedure.  Ask your surgeon what he  recommends.  - Signs and symptoms of infection can include:  low grade fever, redness, pain, swelling and drainage from your incision.  Notify your surgeon immediately if you develop any of these symptoms.  Post op Disturbances  - Bowel habits - constipation is extremely common and is caused by a combination of anesthesia, lack of mobility and pain medicine.  Use stool softeners or laxatives if necessary. It is important not to ignore this problem, as bowel obstructions can be a serious complication after joint replacement surgery.  - Mood/Energy Level - Many patients experience a lack of energy and endurance for up to 2-3 months after surgery.  Some may also feel down and can even become depressed.  This is likely due to the postoperative anemia, change in activity level, lack of sleep, pain medicine and just the emotional reaction to the surgery itself that is a big disruption in a person’s life.  This usually passes.  If symptoms persist, follow up with your primary physician.  - Returning to work - Your surgeon will give you more specific instructions.  Generally, if you work a sedentary job requiring little standing or walking, most patients may return within 2-6 weeks.  Manual labor jobs involving walking, lifting and standing may take 3-4 months.  Your surgeon’s office can provide a release to part-time or light duty work early on in your recovery and progress you to full duty as able.  - Driving - You can begin driving an automatic shift car in 4 to 8 weeks, provided you are no longer taking narcotic pain medication. If you have a stick-shift car and your right hip was replaced, do not begin driving until your doctor says you can.   - Avoiding falls -  throw rugs and tack down loose carpeting.  Be aware of floor hazards such as pets, small objects or uneven surfaces.   -  Airport Metal Detectors - The sensitivity of metal detectors varies and it is likely that your prosthesis will cause an alarm.  Inform the  that you have an artificial joint.  Diet  - Resume your normal diet as tolerated.  - It is important to achieve a healthy nutritional status by eating a well balanced diet on a regular basis.  - Your physician may recommend that you take iron and vitamin supplements.   - Continue to drink plenty of fluids.  Shower/Bathing  - You may shower as soon as you get home from the hospital unless otherwise instructed.  - Keep your incision out of water.  To keep the incision dry when showering, cover it with a plastic bag or plastic wrap.  - Pat incision dry if it gets wet.  Don’t rub.  - Do not submerge in a bath until staples are out and the incision is completely healed. (Approximately 6-8 weeks after surgery).  Dressing Change:  Procedure (if recommended by your physician)  - Wash hands.  - Open all dressing change materials.  - Remove old dressing and discard.  - Inspect incision for redness, increase in clear drainage, yellow/green drainage, odor and surrounding skin hot to touch.  -  ABD (large gauze) pad by one corner and lay over the incision.  Be careful not to touch the inside of the dressing that will lay over the incision.  - Secure in place as instructed (Ace wrap or tape).    Swelling/Bruising  - Swelling is normal after hip replacement and can involve the thigh, knee, calf and foot.  - Swelling can last from 3-6 months.  - Elevate your leg higher than your heart while reclining.  The first week you are home you should elevate your leg an equal amount of time, as you are active.    - Anti-inflammatory pills can be taken once you have stopped the blood thinners.  - The swelling is usually worse after you go home since you are upright for longer periods of time.  - Bruising is common and can involve the entire leg including the thigh, calf and even foot.  Bruising often does not appear until after you arrive home and it can be quite dramatic- purple, black, green.  The bruising  you can see is not usually concerning and will subside without any treatment.      Blood Clot Prevention  Blood clots in the legs and the less common, but frightening, clots that travel to the lungs are a real focus of our preventative. Most patients are at standard risk for them, but those patients who are at higher risk include people who have had previous clots, a family history of clotting, smoking, diabetes, obesity, advanced age, use of estrogen and a sedentary lifestyle.    - Signs of blood clots in legs - Swelling in thigh, calf or ankle that does not go down with elevation.  Pain, heat and tenderness in calf, back of calf or groin area.  NOTE: blood clots can occur in either leg.  - You have been receiving anticoagulant therapy (blood thinners) in the hospital and you may be instructed to continue at home depending on your risk factors.  - Your risk for developing a clot continues for up to 2-3 months after surgery.  You should avoid prolonged sitting and dehydration during that time (long air trips and car trips).  If you do take a trip during this time, please get up and move around every 1- 1.5 hours.  - If you are prescribed blood thinning medication for home, follow instructions as directed. (Handouts provided if applicable).      Activity    Once you get home, you should stay active. The key is not to overdo it! While you can expect some good days and some bad days, you should notice a gradual improvement over time you should notice a gradual improvement and a gradual increase in your endurance over the next 6 to 12 months.    - Weight Bearing - If you have undergone cemented or hybrid hip replacement, you can put some weight on the leg immediately using a cane or walker, and you should continue to use some support for 4 to 6 weeks to help the muscles recover.   - Sleeping Positions - Sleep on your back with your legs slightly apart or on your side with a regular pillow between your knees. Be sure to  use the pillow for at least 6 weeks, or until your doctor says you can do without it. Sleeping on your stomach should be all right  - Sitting - For at least the first 3 months, sit only in chairs that have arms. Do not sit on low chairs, low stools, or reclining chairs. Do not cross your legs at the knees. The physical therapist will show you how to sit and stand from a chair, keeping your affected leg out in front of you. Get up and move around on a regular basis--at least once every hour.  - Walking - Walk as much as you like once your doctor gives you the go-ahead, but remember that walking is no substitute for your prescribed exercises. Walking with a pair of trekking poles is helpful and adds as much as 40% to the exercise you get when you walk  - Therapy may be needed in some cases, to strengthen your muscles and improve your gait (walking pattern).  This decision will be made at your post-operative appointment.  Follow your therapist recommended post-operative exercises (handout provided by Therapist).  - Swimming is also recommended; you can begin as soon as the sutures have been removed and the wound is healed, approximately 6 to 8 weeks after surgery. Using a pair of training fins may make swimming a more enjoyable and effective exercise.  - Other activities - Lower impact activities are preferred.  If you have specific questions, consult your Surgeon.    - Sexual activity - Your surgeon can tell you when it should be safe to resume sexual activity.      When to Call the Doctor   Call the physician if:   - Fever over 100.5? F  - Increased pain, drainage, redness, odor or heat around the incision area  - Shaking chills  - Increased knee pain with activity and rest  - Increased pain in calf, tenderness or redness above or below the knee  - Increased swelling of calf, ankle, foot  - Sudden increased shortness of breath, sudden onset of chest pain, localized chest pain with coughing  - Incision opening  Or, if  there are any questions or concerns about medications or care.       -Is this patient being discharged with medication to prevent blood clots?  Yes, Aspirin Aspirin, ASA oral tablets  What is this medicine?  ASPIRIN (AS pir in) is a pain reliever. It is used to treat mild pain and fever. This medicine is also used as directed by a doctor to prevent and to treat heart attacks, to prevent strokes, and to treat arthritis or inflammation.  This medicine may be used for other purposes; ask your health care provider or pharmacist if you have questions.  COMMON BRAND NAME(S): Aspir-Low, Aspir-Aurora, Aspirtab, Cinda Advanced Aspirin, Cinda Aspirin, Cinda Aspirin Extra Strength, Cinda Aspirin Plus, Cinda Extra Strength, Cinda Extra Strength Plus, Cinda Genuine Aspirin, Cinda Womens Aspirin, Bufferin, Bufferin Extra Strength, Bufferin Low Dose  What should I tell my health care provider before I take this medicine?  They need to know if you have any of these conditions:  -anemia  -asthma  -bleeding problems  -child with chickenpox, the flu, or other viral infection  -diabetes  -gout  -if you frequently drink alcohol containing drinks  -kidney disease  -liver disease  -low level of vitamin K  -lupus  -smoke tobacco  -stomach ulcers or other problems  -an unusual or allergic reaction to aspirin, tartrazine dye, other medicines, dyes, or preservatives  -pregnant or trying to get pregnant  -breast-feeding  How should I use this medicine?  Take this medicine by mouth with a glass of water. Follow the directions on the package or prescription label. You can take this medicine with or without food. If it upsets your stomach, take it with food. Do not take your medicine more often than directed.  Talk to your pediatrician regarding the use of this medicine in children. While this drug may be prescribed for children as young as 12 years of age for selected conditions, precautions do apply. Children and teenagers should not use this  medicine to treat chicken pox or flu symptoms unless directed by a doctor.  Patients over 65 years old may have a stronger reaction and need a smaller dose.  Overdosage: If you think you have taken too much of this medicine contact a poison control center or emergency room at once.  NOTE: This medicine is only for you. Do not share this medicine with others.  What if I miss a dose?  If you are taking this medicine on a regular schedule and miss a dose, take it as soon as you can. If it is almost time for your next dose, take only that dose. Do not take double or extra doses.  What may interact with this medicine?  Do not take this medicine with any of the following medications:  -cidofovir  -ketorolac  -probenecid  This medicine may also interact with the following medications:  -alcohol  -alendronate  -bismuth subsalicylate  -flavocoxid  -herbal supplements like feverfew, garlic, frantz, ginkgo biloba, horse chestnut  -medicines for diabetes or glaucoma like acetazolamide, methazolamide  -medicines for gout  -medicines that treat or prevent blood clots like enoxaparin, heparin, ticlopidine, warfarin  -other aspirin and aspirin-like medicines  -NSAIDs, medicines for pain and inflammation, like ibuprofen or naproxen  -pemetrexed  -sulfinpyrazone  -varicella live vaccine  This list may not describe all possible interactions. Give your health care provider a list of all the medicines, herbs, non-prescription drugs, or dietary supplements you use. Also tell them if you smoke, drink alcohol, or use illegal drugs. Some items may interact with your medicine.  What should I watch for while using this medicine?  If you are treating yourself for pain, tell your doctor or health care professional if the pain lasts more than 10 days, if it gets worse, or if there is a new or different kind of pain. Tell your doctor if you see redness or swelling. Also, check with your doctor if you have a fever that lasts for more than 3 days.  Only take this medicine to prevent heart attacks or blood clotting if prescribed by your doctor or health care professional.  Do not take aspirin or aspirin-like medicines with this medicine. Too much aspirin can be dangerous. Always read the labels carefully.  This medicine can irritate your stomach or cause bleeding problems. Do not smoke cigarettes or drink alcohol while taking this medicine. Do not lie down for 30 minutes after taking this medicine to prevent irritation to your throat.  If you are scheduled for any medical or dental procedure, tell your healthcare provider that you are taking this medicine. You may need to stop taking this medicine before the procedure.  This medicine may be used to treat migraines. If you take migraine medicines for 10 or more days a month, your migraines may get worse. Keep a diary of headache days and medicine use. Contact your healthcare professional if your migraine attacks occur more frequently.  What side effects may I notice from receiving this medicine?  Side effects that you should report to your doctor or health care professional as soon as possible:  -allergic reactions like skin rash, itching or hives, swelling of the face, lips, or tongue  -breathing problems  -changes in hearing, ringing in the ears  -confusion  -general ill feeling or flu-like symptoms  -pain on swallowing  -redness, blistering, peeling or loosening of the skin, including inside the mouth or nose  -signs and symptoms of bleeding such as bloody or black, tarry stools; red or dark-brown urine; spitting up blood or brown material that looks like coffee grounds; red spots on the skin; unusual bruising or bleeding from the eye, gums, or nose  -trouble passing urine or change in the amount of urine  -unusually weak or tired  -yellowing of the eyes or skin  Side effects that usually do not require medical attention (report to your doctor or health care professional if they continue or are  bothersome):  -diarrhea or constipation  -headache  -nausea, vomiting  -stomach gas, heartburn  This list may not describe all possible side effects. Call your doctor for medical advice about side effects. You may report side effects to FDA at 8-761-IDZ-1238.  Where should I keep my medicine?  Keep out of the reach of children.  Store at room temperature between 15 and 30 degrees C (59 and 86 degrees F). Protect from heat and moisture. Do not use this medicine if it has a strong vinegar smell. Throw away any unused medicine after the expiration date.  NOTE: This sheet is a summary. It may not cover all possible information. If you have questions about this medicine, talk to your doctor, pharmacist, or health care provider.  © 2018 [employer] (2014-08-19 11:30:31)      · Is patient discharged on Warfarin / Coumadin?   No     Total Hip Replacement, Care After  These instructions give you information on caring for yourself after your procedure. Your doctor also may give you specific instructions. Call your doctor if you have any problems or questions after your procedure.  Follow these instructions at home:  Your doctor will give you instructions on what types of movements are okay and not okay.  · Take medicines as told by your doctor.  · Do not take baths, swim, or use a hot tub until your doctor says that it is okay.  · Avoid lifting until your doctor says it is okay.  · Use a raised toilet as told by your doctor.  · Avoid sitting in low chairs as told by your doctor.  · Use crutches or a walker as told by your doctor.  · Rest often, but move around as much as you can. Movement helps you to heal and helps to prevent problems.  · Wear special socks (compression stockings) as told by your doctor. These socks help to prevent blood clots and lessen swelling of your legs.  · Follow instructions from your doctor about how to take care of your cut from surgery (incision). Make sure you:  ¨ Wash your hands with  soap and water before you change your bandage (dressing). If you cannot use soap and water, use hand .  ¨ Change your bandage as told by your doctor.  ¨ Leave stitches (sutures), skin glue, or skin tape (adhesive) strips in place. These may need to stay in place for 2 weeks or longer. If the tape strips get loose and curl up, you may trim the loose edges. Do not remove the strips completely unless your doctor says it is okay.  Contact a doctor if:  · You have trouble breathing.  · You have fluid coming from your surgery site.  · You have redness or swelling at your surgery site.  · You have a bad smell coming from your surgery site.  · You have bleeding that will not stop.  · Your surgical cut opens up after sutures (stitches) or staples are removed.  · You have a fever.  Get help right away if:  · You have a rash.  · You have pain or puffiness on your thigh or on the back of your lower leg.  · You have shortness of breath or chest pain.  This information is not intended to replace advice given to you by your health care provider. Make sure you discuss any questions you have with your health care provider.  Document Released: 03/11/2013 Document Revised: 08/21/2017 Document Reviewed: 02/18/2015  [employer] Patient Education © 2017 [employer]      Depression / Suicide Risk    As you are discharged from this [facility] facility, it is important to learn how to keep safe from harming yourself.    Recognize the warning signs:  · Abrupt changes in personality, positive or negative- including increase in energy   · Giving away possessions  · Change in eating patterns- significant weight changes-  positive or negative  · Change in sleeping patterns- unable to sleep or sleeping all the time   · Unwillingness or inability to communicate  · Depression  · Unusual sadness, discouragement and loneliness  · Talk of wanting to die  · Neglect of personal appearance   · Rebelliousness- reckless  behavior  · Withdrawal from people/activities they love  · Confusion- inability to concentrate     If you or a loved one observes any of these behaviors or has concerns about self-harm, here's what you can do:  · Talk about it- your feelings and reasons for harming yourself  · Remove any means that you might use to hurt yourself (examples: pills, rope, extension cords, firearm)  · Get professional help from the community (Mental Health, Substance Abuse, psychological counseling)  · Do not be alone:Call your Safe Contact- someone whom you trust who will be there for you.  · Call your local CRISIS HOTLINE 586-3521 or 836-978-1212  · Call your local Children's Mobile Crisis Response Team [place] (617) 292-0490 or www.[employer]  · Call the toll free National Suicide Prevention Hotlines   · National Suicide Prevention Lifeline 753-285-RTCM (1463)  · Between [employer] 800-SUICIDE (968-0518)      How can pain medicine affect me?  You were prescribed pain medicine. This medicine may:  · Make you tired or sleepy.  · Make you feel dizzy.  · Affect how well you can:  ¨ Drive  ¨ Do certain activities.  Pain medicine may not make all of your pain go away. You should be comfortable enough to:  · Move.  · Breathe.  · Take care of yourself.  How often should I take pain medicine and how much should I take?  · Take pain medicine only as told by your doctor and only as needed for pain.  · You do not need to take pain medicine if you are not having pain, unless your doctor tells you to do that.  · You can take less than the prescribed dose if you find that less medicine helps your pain.  · If you have very bad (severe) pain, call your doctor. Do not take more pills than told by your doctor. Do not take pills more often than told by your doctor.  What should I avoid while I am taking pain medicine?  Follow these instructions after you start taking pain medicine, while you are taking the medicine, and for 8 hours  after you stop taking the medicine:  · Do not drive.  · Do not use machinery.  · Do not use power tools.  · Do not sign legal documents.  · Do not drink alcohol.  · Do not take sleeping pills.  · Do not take care of children by yourself.  · Do not do any activities that involve climbing or being in high places.  · Do not go into any body of water unless there is an adult nearby who can watch and help you. This includes:  ¨ Lakes.  ¨ Rivers.  ¨ Oceans.  ¨ Spas.  ¨ Swimming pools.  How can I keep others safe while I am taking pain medicine?  · Store your pain medicine as told by your doctor. Make sure that you keep it where children and pets cannot reach it.  · Do not share your pain medicine with anyone.  · Do not save any leftover pills. If you have any leftover pain medicine, get rid of it or destroy it as told by your doctor.  What else do I need to know about taking pain medicine?  · Use a poop (stool) softener if you have trouble pooping (constipation) because of your pain medicine. Eating more fruits and vegetables also helps with constipation.  · Write down the times when you take your pain medicine. Look at the times before you take your next dose of medicine.  · Your pain medicine might have acetaminophen in it. Do not take any other acetaminophen while you are taking this medicine. An overdose of acetaminophen can do very bad damage to your liver. If you are taking any medicines in addition to your pain medicine, check the active ingredients on those medicines to see if acetaminophen is listed.  When should I call my doctor?  · Your medicine is not helping the pain.  · You do either of these soon after you take the medicine:  ¨ Throw up (vomit).  ¨ Have watery poop (diarrhea).  · You have new pain in areas that did not hurt before.  · You have an allergic reaction to your medicine. This may include:  ¨ Feeling itchy.  ¨ Swelling.  ¨ Feeling dizzy.  ¨ Getting a new rash.  · You cannot put up with  feeling:  ¨ Dizzy.  ¨ Sick to your stomach (nauseous).  When should I call 911 or go to the emergency room?  · You pass out (faint).  · You feel very confused.  · You throw up again and again.  · Your skin or lips turn pale or bluish in color.  · You are:  ¨ Short of breath.  ¨ Breathing much more slowly than usual.  · You have a very bad allergic reaction to your medicine. This includes:  ¨ Developing a swollen tongue.  ¨ Having trouble breathing.  This information is not intended to replace advice given to you by your health care provider. Make sure you discuss any questions you have with your health care provider.  Document Released: 06/05/2009 Document Revised: 08/24/2017 Document Reviewed: 10/22/2015  © 2017 [employer]

## 2019-04-13 NOTE — PROGRESS NOTES
Patient discharged to home. No IV access. Pain well controlled with PO medications. Voiding adequate amounts without difficulty. Ambulating with steady gait with FWW. Discharge instructions provided, patient has prescriptions already. Patient and family verbalized understanding of discharge instructions and have no further questions or concerns at this time

## 2019-04-22 ENCOUNTER — PHYSICAL THERAPY (OUTPATIENT)
Dept: PHYSICAL THERAPY | Facility: REHABILITATION | Age: 44
End: 2019-04-22
Attending: ORTHOPAEDIC SURGERY
Payer: COMMERCIAL

## 2019-04-22 DIAGNOSIS — M16.11 UNILATERAL PRIMARY OSTEOARTHRITIS, RIGHT HIP: ICD-10-CM

## 2019-04-22 PROCEDURE — 97162 PT EVAL MOD COMPLEX 30 MIN: CPT

## 2019-04-22 PROCEDURE — 97110 THERAPEUTIC EXERCISES: CPT

## 2019-04-22 ASSESSMENT — ENCOUNTER SYMPTOMS
PAIN SCALE AT LOWEST: 1
PAIN SCALE AT HIGHEST: 8
PAIN TIMING: INTERMITTENT
QUALITY: ACHING
PAIN SCALE: 3

## 2019-04-22 NOTE — OP THERAPY EVALUATION
Outpatient Physical Therapy  INITIAL EVALUATION    [facility] Physical Therapy [address]  [address]  [place] [address]  Phone:  [phone]  Fax:  [phone]    Date of Evaluation: 2019    Patient: Jenelle Morfin  YOB: 1975  MRN: [id number]     Referring Provider: Silvia Ann M.D.  [address] [place] [address]   Referring Diagnosis Unilateral primary osteoarthritis, right hip [M16.11]     Time Calculation  Start time: 830  Stop time: 930 Time Calculation (min): 60 minutes     Physical Therapy Occurrence Codes    Date of onset of impairment:  19   Date physical therapy care plan established or reviewed:  19   Date physical therapy treatment started:  19          Chief Complaint: No chief complaint on file.    Visit Diagnoses     ICD-10-CM   1. Unilateral primary osteoarthritis, right hip M16.11         Subjective:   History of Present Illness:     Mechanism of injury:  Date of surgery 2019 R YAHIR  secondary to OA and bone spurs, 12 years chronic hip pain, family history of hip OA.  Chronic groin and leg pain.  S/P arthroscopy  5 years ago.   No post op precautions per patient.  Using fww since surgery. Having post op pain left lower rib cage, wakes her in the night, and intermittently in the day.   + N/T right calcaneus,  Prior level of function:  [employer]-interval training , cycling  Sleep disturbance:  Interrupted sleep  Pain:     Current pain rating:  3    At best pain ratin    At worst pain ratin    Quality:  Aching    Pain timing:  Intermittent  Social Support:     Lives in:  One-story house    Lives with:  Spouse  Treatments:     None    Activities of Daily Living:     Patient reported ADL status: Using reacher and raised toilet seat at home   Using fww for ambulation around the home and in community   Patient's  is assisting with ADLS  Patient Goals:     Patient goals for therapy:  Return to  sport/leisure activities, decreased pain, increased strength, return to work, independence with ADLs/IADLs, improved balance, increased motion and decreased edema    Other patient goals:  Get back to interval training and snowboarding      Past Medical History:   Diagnosis Date   • Arthritis     Hips   • 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    • ENDOMETRIAL ABLATION     • HIP ARTHROSCOPY Right      Social History   Substance Use Topics   • Smoking status: Never Smoker   • Smokeless tobacco: Never Used   • Alcohol use 1.8 oz/week     3 Glasses of wine per week      Comment: glass of wine most days of the week     Family and Occupational History     Social History   • Marital status:      Spouse name: N/A   • Number of children: N/A   • Years of education: N/A       Objective     Observations     Right Hip  Positive for edema and incision.     Additional Observation Details  Right sponge/drain in place over incision/no significant drainage       Palpation     Right   Tenderness of the gluteus angeles, gluteus medius, iliopsoas, piriformis and TFL.     Active Range of Motion     Right Hip   Flexion: 90 degrees   Abduction: 25 degrees   External rotation (90/90): 15 degrees     Additional Active Range of Motion Details  SLR right 20 degrees    Strength:      Left Hip   Normal muscle strength    Additional Strength Details  Right quad 5/5, ankle 5/5 grossly, hip NT  Ambulation     Observational Gait   Gait:  antalgic   Decreased walking speed, stride length, right stance time, right swing time and right step length. Stride width: narrow.    Right foot contact pattern: heel to toe    Additional Observational Gait Details  2 x 150 feet with fww @ Indep  2 x 50 feet with SPC @ S decreased weights hift right    Quality of Movement During Gait     Additional Quality of Movement During Gait Details  Decreased weightbearing/weightshift right with stance, able to correct and normalize gait with verbal cueing        Therapeutic Exercises (CPT 25393):     1. SLR, x 10    2. Bridge, x 10 15 sec hold    3. Heel slide, x 10    4. Hip abduction supine, x 10    5. Heel raises, x 10    6. Sit to stand, x 10    Therapeutic Treatments and Modalities:     1. Gait Training (CPT 96331), Gait training with SPC and fww 2 x 150 feet each@ Indep     Time-based treatments/modalities:  Gait training minutes (CPT 75911): 5 minutes  Therapeutic exercise minutes (CPT 38110): 15 minutes       Assessment, Response and Plan:   Assessment details:  Patient S/P Right hip YAHIR 4/12/2019 presents with impaired gait with decreased weightbearing right LE, impaired AROM and strength right hip.  Patient demonstrated increased fear avoidance but improved with verbal cueing, education and practice with home exercise program and gait training.  Patient has an excellent rehab prognosis.  Barriers to therapy:  None  Prognosis: good    Goals:   Short Term Goals:   Patient ambulates with SPC VS fww with normalized gait and no compensatory trunk lean  Patient able to perform ADLS with >50% decreased symptoms  Short term goal time span:  2-4 weeks      Long Term Goals:    Full AROM right hip within precautions  Ambulate  without assistive device > 15 min  Return to gym program within precautions.    Long term goal time span:  6-8 weeks    Plan:   Therapy options:  Physical therapy treatment to continue  Planned therapy interventions:  E Stim Unattended (CPT 16339),  Therapeutic Exercise (CPT 18555), Manual Therapy (CPT 19595), Neuromuscular Re-education (CPT 88127) and Therapeutic Activities (CPT 34604)  Frequency:  2x week  Duration in weeks:  8  Duration in visits:  12  Plan details:  1-2 x week x 8 weeks      Functional Limitations and Severity Modifiers  WOMAC Grand Total: 48.96   Current:     Goal:       Referring provider co-signature:  I have reviewed this plan of care and my co-signature certifies the need for services.  Certification Dates:   From 4/22/2019    To 6/10/2019    Physician Signature: ________________________________ Date: ______________

## 2019-04-30 ENCOUNTER — PHYSICAL THERAPY (OUTPATIENT)
Dept: PHYSICAL THERAPY | Facility: REHABILITATION | Age: 44
End: 2019-04-30
Attending: ORTHOPAEDIC SURGERY
Payer: COMMERCIAL

## 2019-04-30 DIAGNOSIS — M16.11 UNILATERAL PRIMARY OSTEOARTHRITIS, RIGHT HIP: ICD-10-CM

## 2019-04-30 PROCEDURE — 97116 GAIT TRAINING THERAPY: CPT

## 2019-04-30 PROCEDURE — 97014 ELECTRIC STIMULATION THERAPY: CPT

## 2019-04-30 PROCEDURE — 97110 THERAPEUTIC EXERCISES: CPT

## 2019-04-30 NOTE — OP THERAPY DAILY TREATMENT
Outpatient Physical Therapy  DAILY TREATMENT     [facility] Physical [address].  [address]  [place] [address]  Phone:  [phone]  Fax:  [phone]    Date: 04/30/2019    Patient: Jenelle Morfin  YOB: 1975  MRN: [id number]     Time Calculation  Start time: 1530  Stop time: 1600 Time Calculation (min): 30 minutes     Chief Complaint: No chief complaint on file.    Visit #: 2    SUBJECTIVE:  Walking around the house with SPC, not using fww now    OBJECTIVE:  Current objective measures:           Therapeutic Exercises (CPT 15171):     1. Nu step L 5 , 10 min    2. Bridging, review    Therapeutic Treatments and Modalities:     1. Gait Training (CPT 19604), see below    2. E Stim Unattended (CPT 09056), IFC with MHP to right glute with MHP prone    Therapeutic Treatment and Modalities Summary: Forward ambulation 250 ft without assistive device @ Indep  Backward and lateral gait x 3-4 min each in parallel bars with min UE support with focus on weight shift, confidence       Time-based treatments/modalities:  Manual therapy minutes (CPT 12437): 8 minutes  Gait training minutes (CPT 42276): 15 minutes  Therapeutic exercise minutes (CPT 64662): 10 minutes       Pain rating before treatment: 3  Pain rating after treatment: 3    ASSESSMENT:   Response to treatment: Patient gaining confidence with weightbearing on right hip and trusting it with ADLSs. Still highly anxious about injuring hip.  Recommended using SPC in community and no assistive device at home for short distances.    PLAN/RECOMMENDATIONS:   Plan for treatment: therapy treatment to continue next visit.  Planned interventions for next visit: continue with current treatment.

## 2019-05-07 ENCOUNTER — PHYSICAL THERAPY (OUTPATIENT)
Dept: PHYSICAL THERAPY | Facility: REHABILITATION | Age: 44
End: 2019-05-07
Attending: ORTHOPAEDIC SURGERY
Payer: COMMERCIAL

## 2019-05-07 DIAGNOSIS — M16.11 UNILATERAL PRIMARY OSTEOARTHRITIS, RIGHT HIP: ICD-10-CM

## 2019-05-07 PROCEDURE — 97140 MANUAL THERAPY 1/> REGIONS: CPT

## 2019-05-07 PROCEDURE — 97110 THERAPEUTIC EXERCISES: CPT

## 2019-05-07 PROCEDURE — 97014 ELECTRIC STIMULATION THERAPY: CPT

## 2019-05-07 NOTE — OP THERAPY DAILY TREATMENT
Outpatient Physical Therapy  DAILY TREATMENT     [facility] Physical [address].  [address]  [place] [address]  Phone:  [phone]  Fax:  [phone]    Date: 05/07/2019    Patient: Jenelle Morfin  YOB: 1975  MRN: [id number]     Time Calculation  Start time: 1300  Stop time: 1410 Time Calculation (min): 70 minutes     Chief Complaint: No chief complaint on file.    Visit #: 3    SUBJECTIVE:  Wants to work out at gym, feeling good-not using cane at home      OBJECTIVE:  Current objective measures:           Therapeutic Exercises (CPT 76073):     1. Nu step L 5 , 10 min    2. Bridging, review    3. Gutter step single leg 4 inch step, 2 x 10 with UE support    4. Step ups 6 inch with minimal UE support, x 15 leading with right and then left    5. Bosu balance DL and weightshift, eo/ec, 4 x 2 min    6. Lateral step with orange tband, 4 x 6 feet    7. Hip extension/flex with orange tband standing, x 15    Therapeutic Treatments and Modalities:     1. Manual Therapy (CPT 42560), STM/scar mobilization right hip incision    2. E Stim Unattended (CPT 92051), IFC with MHP to right glute with MHP prone    Therapeutic Treatment and Modalities Summary:       Time-based treatments/modalities:  Manual therapy minutes (CPT 23642): 8 minutes  Therapeutic exercise minutes (CPT 89782): 35 minutes       Pain rating before treatment: 1  Pain rating after treatment: 1    ASSESSMENT:   Response to treatment: progressing well with standing/increase load and right hip confidence with gait and weight shift right.       PLAN/RECOMMENDATIONS:   Plan for treatment: therapy treatment to continue next visit.  Planned interventions for next visit: continue with current treatment.

## 2019-05-09 ENCOUNTER — PHYSICAL THERAPY (OUTPATIENT)
Dept: PHYSICAL THERAPY | Facility: REHABILITATION | Age: 44
End: 2019-05-09
Attending: ORTHOPAEDIC SURGERY
Payer: COMMERCIAL

## 2019-05-09 DIAGNOSIS — M16.11 UNILATERAL PRIMARY OSTEOARTHRITIS, RIGHT HIP: ICD-10-CM

## 2019-05-09 PROCEDURE — 97110 THERAPEUTIC EXERCISES: CPT

## 2019-05-09 PROCEDURE — 97140 MANUAL THERAPY 1/> REGIONS: CPT

## 2019-05-09 PROCEDURE — 97014 ELECTRIC STIMULATION THERAPY: CPT

## 2019-05-09 NOTE — OP THERAPY DAILY TREATMENT
Outpatient Physical Therapy  DAILY TREATMENT     [facility] Physical Therapy [address].  [address]  [place] [address]  Phone:  [phone]  Fax:  [phone]    Date: 05/09/2019    Patient: Jenelle Morfin  YOB: 1975  MRN: [id number]     Time Calculation  Start time: 1330  Stop time: 1437 Time Calculation (min): 67 minutes     Chief Complaint: No chief complaint on file.    Visit #: 4    SUBJECTIVE:  Increased left side pain and lumbar pain, tolerated last session well, still not confident standing on right LE/new hip    OBJECTIVE:  Current objective measures:           Therapeutic Exercises (CPT 16258):     1. Elliptical, 5 min    2. Bridging, 5 x 30 sec with TA stab    3. Gutter step single leg 4 inch step, 2 x 10 with UE support    4. Ball curls with TA stab    5. Bosu balance DL kneeling and weightshift, eo/ec, 4 x 2 min    6. Lateral step with orange tband, 4 x 6 feet    7. Hip extension/flex with orange tband standing, x 15, reviewed only    Therapeutic Treatments and Modalities:     1. Manual Therapy (CPT 16913), lumbar unilateral rotation mob , long axis traction prone to left LE 4 x 1 min, QL MFR left     2. E Stim Unattended (CPT 14043), IFC with MHP prone lumbar spine    Therapeutic Treatment and Modalities Summary:       Time-based treatments/modalities:  Manual therapy minutes (CPT 17885): 10 minutes  Therapeutic exercise minutes (CPT 04005): 30 minutes       Pain rating before treatment: 0  Pain rating after treatment: 0    ASSESSMENT:   Response to treatment: increased left lumbar pain relieved with long axis traction  PLAN/RECOMMENDATIONS:   Plan for treatment: therapy treatment to continue next visit.  Planned interventions for next visit: continue with current treatment.

## 2019-05-14 ENCOUNTER — APPOINTMENT (OUTPATIENT)
Dept: PHYSICAL THERAPY | Facility: REHABILITATION | Age: 44
End: 2019-05-14
Attending: ORTHOPAEDIC SURGERY
Payer: COMMERCIAL

## 2019-05-16 ENCOUNTER — PHYSICAL THERAPY (OUTPATIENT)
Dept: PHYSICAL THERAPY | Facility: REHABILITATION | Age: 44
End: 2019-05-16
Attending: ORTHOPAEDIC SURGERY
Payer: COMMERCIAL

## 2019-05-16 DIAGNOSIS — M16.11 UNILATERAL PRIMARY OSTEOARTHRITIS, RIGHT HIP: ICD-10-CM

## 2019-05-16 PROCEDURE — 97140 MANUAL THERAPY 1/> REGIONS: CPT

## 2019-05-16 PROCEDURE — 97110 THERAPEUTIC EXERCISES: CPT

## 2019-05-16 PROCEDURE — 97014 ELECTRIC STIMULATION THERAPY: CPT

## 2019-05-16 NOTE — OP THERAPY DAILY TREATMENT
Outpatient Physical Therapy  DAILY TREATMENT     [facility] Physical [address].  [address]  [place] [address]  Phone:  [phone]  Fax:  [phone]    Date: 05/16/2019    Patient: Jenelle Morfin  YOB: 1975  MRN: [id number]     Time Calculation  Start time: 1500  Stop time: 1550 Time Calculation (min): 50 minutes     Chief Complaint: No chief complaint on file.    Visit #: 5    SUBJECTIVE: walking without cane most of the day, hip feels tight      OBJECTIVE:  Current objective measures:         Therapeutic Exercises (CPT 76156):     1. Elliptical, 5 min    2. Bridging, 5 x 30 sec with TA stab    3. Gutter step single leg 4 inch step, 2 x 10 with UE support, review only    4. Ball curls with TA stab    5. Bosu balance DL kneeling and weightshift, eo/ec, 4 x 2 min, review    6. Lateral step with orange tband, 4 x 6 feet, review only    7. Hip extension/flex with orange tband standing, x 15, reviewed only    8. Half kneeling repeated hip extension on foam, x 20    9. Dynamic standing hamstring stretch, x 20    10. DL kneeling 2 x 1 min    11. DL kneeling with Lat pull red tband, x 20    Therapeutic Treatments and Modalities:     1. Manual Therapy (CPT 57094), STM right glutes sona incision, proximal quad and tfl,repeated hip extension SL ,     2. E Stim Unattended (CPT 20607), IFC with MHP prone glute spine    Therapeutic Treatment and Modalities Summary:       Time-based treatments/modalities:  Manual therapy minutes (CPT 72342): 10 minutes  Therapeutic exercise minutes (CPT 57569): 20 minutes       ASSESSMENT:   Response to treatment: slight limp with gait when fatigued otherwise improved reciprocal gait.  Good tolerance to stabilization progression    PLAN/RECOMMENDATIONS:   Plan for treatment: therapy treatment to continue next visit.  Planned interventions for next visit: continue with current treatment.

## 2019-05-20 ENCOUNTER — PHYSICAL THERAPY (OUTPATIENT)
Dept: PHYSICAL THERAPY | Facility: REHABILITATION | Age: 44
End: 2019-05-20
Attending: ORTHOPAEDIC SURGERY
Payer: COMMERCIAL

## 2019-05-20 DIAGNOSIS — M16.11 UNILATERAL PRIMARY OSTEOARTHRITIS, RIGHT HIP: ICD-10-CM

## 2019-05-20 PROCEDURE — 97014 ELECTRIC STIMULATION THERAPY: CPT

## 2019-05-20 PROCEDURE — 97110 THERAPEUTIC EXERCISES: CPT

## 2019-05-20 NOTE — OP THERAPY DAILY TREATMENT
Outpatient Physical Therapy  DAILY TREATMENT     [facility] Physical [address].  [address]  [place] [address]  Phone:  [phone]  Fax:  [phone]    Date: 05/20/2019    Patient: Jenelle Morfin  YOB: 1975  MRN: [id number]     Time Calculation  Start time: 1535  Stop time: 1620 Time Calculation (min): 45 minutes     Chief Complaint: No chief complaint on file.    Visit #: 6    SUBJECTIVE: doing well, no pain, improved confidence with hip with stairs, gait      OBJECTIVE:  Current objective measures:         Therapeutic Exercises (CPT 65202):     1. Elliptical L5 ramp 4, 9min    3. Gutter step single leg 4 inch step, 2 x 10 with UE support    4. Clamshells red tband, 2 x 15    7. SL hip extension/abduction right, 2 x 15    8. Half kneeling repeated hip extension on foam, x 20    11. DL kneeling with Lat pull red tband, x 20    Therapeutic Treatments and Modalities:     1. Manual Therapy (CPT 42500), STM right glutes sona incision, proximal quad and tfl,repeated hip extension SL ,     2. E Stim Unattended (CPT 81334), IFC with MHP prone glute spine    Therapeutic Treatment and Modalities Summary:       Time-based treatments/modalities:  Manual therapy minutes (CPT 20825): 5 minutes  Therapeutic exercise minutes (CPT 48743): 25 minutes       Pain rating before treatment: 0  Pain rating after treatment: 0    ASSESSMENT:   Response to treatment: Level pelvis and  improved symmetry with gait. Patient progressing well with progressive loading and single leg strength.  Decrease frequency to 1 x week    PLAN/RECOMMENDATIONS:   Plan for treatment: therapy treatment to continue next visit.  Planned interventions for next visit: continue with current treatment.

## 2019-05-21 ENCOUNTER — APPOINTMENT (OUTPATIENT)
Dept: PHYSICAL THERAPY | Facility: REHABILITATION | Age: 44
End: 2019-05-21
Payer: COMMERCIAL

## 2019-05-23 ENCOUNTER — APPOINTMENT (OUTPATIENT)
Dept: PHYSICAL THERAPY | Facility: REHABILITATION | Age: 44
End: 2019-05-23
Attending: ORTHOPAEDIC SURGERY
Payer: COMMERCIAL

## 2019-06-06 ENCOUNTER — APPOINTMENT (OUTPATIENT)
Dept: PHYSICAL THERAPY | Facility: REHABILITATION | Age: 44
End: 2019-06-06
Attending: ORTHOPAEDIC SURGERY
Payer: COMMERCIAL

## 2019-06-12 ENCOUNTER — APPOINTMENT (OUTPATIENT)
Dept: PHYSICAL THERAPY | Facility: REHABILITATION | Age: 44
End: 2019-06-12
Attending: ORTHOPAEDIC SURGERY
Payer: COMMERCIAL

## 2019-06-17 ENCOUNTER — TELEPHONE (OUTPATIENT)
Dept: PHYSICAL THERAPY | Facility: REHABILITATION | Age: 44
End: 2019-06-17

## 2019-06-17 NOTE — OP THERAPY DISCHARGE SUMMARY
Outpatient Physical Therapy  DISCHARGE SUMMARY NOTE      [address].  [address]  [place] [address]  Phone:  [phone]  Fax:  [phone]    Date of Visit: 06/17/2019    Patient: Jenelle Morfin  YOB: 1975  MRN: [id number]     Referring Provider: Silvia Ann M.D.   Referring Diagnosis  Unilateral primary osteoarthritis, right hip     Physical Therapy Occurrence Codes    Date of onset of impairment:  4/12/19   Date physical therapy care plan established or reviewed:  4/22/19   Date physical therapy treatment started:  4/22/19          Functional Limitations and Severity Modifiers      Goal:     Discharge:         Your patient is being discharged from Physical Therapy with the following comments:   · Goals met  · Patient has failed to schedule or reschedule follow-up visits    Comments:  Patient was seen for 6 visits and prefers to discharge herself from further treatment.  Please see daily treatment notes for details.     Limitations Remaining:  Please see daily treatment notes for details    Recommendations:  Discharge    Marian Hernandez PT, MSPT    Date: 6/17/2019

## 2019-06-18 ENCOUNTER — APPOINTMENT (OUTPATIENT)
Dept: PHYSICAL THERAPY | Facility: REHABILITATION | Age: 44
End: 2019-06-18
Attending: ORTHOPAEDIC SURGERY
Payer: COMMERCIAL

## 2019-06-24 ENCOUNTER — APPOINTMENT (OUTPATIENT)
Dept: PHYSICAL THERAPY | Facility: REHABILITATION | Age: 44
End: 2019-06-24
Attending: ORTHOPAEDIC SURGERY
Payer: COMMERCIAL

## 2019-07-02 ENCOUNTER — APPOINTMENT (OUTPATIENT)
Dept: PHYSICAL THERAPY | Facility: REHABILITATION | Age: 44
End: 2019-07-02
Attending: ORTHOPAEDIC SURGERY
Payer: COMMERCIAL

## 2020-01-08 ENCOUNTER — HOSPITAL ENCOUNTER (OUTPATIENT)
Dept: RADIOLOGY | Facility: MEDICAL CENTER | Age: 45
End: 2020-01-08

## 2020-01-13 ENCOUNTER — HOSPITAL ENCOUNTER (OUTPATIENT)
Dept: RADIOLOGY | Facility: MEDICAL CENTER | Age: 45
End: 2020-01-13

## 2020-01-13 ENCOUNTER — HOSPITAL ENCOUNTER (OUTPATIENT)
Dept: RADIOLOGY | Facility: MEDICAL CENTER | Age: 45
End: 2020-01-13
Attending: FAMILY MEDICINE
Payer: COMMERCIAL

## 2020-01-13 DIAGNOSIS — R92.8 ABNORMAL MAMMOGRAM: ICD-10-CM

## 2020-01-13 PROCEDURE — G0279 TOMOSYNTHESIS, MAMMO: HCPCS | Mod: LT

## 2022-08-08 ENCOUNTER — APPOINTMENT (OUTPATIENT)
Dept: URGENT CARE | Facility: CLINIC | Age: 47
End: 2022-08-08

## 2024-02-05 ENCOUNTER — APPOINTMENT (OUTPATIENT)
Dept: NEUROLOGY | Facility: MEDICAL CENTER | Age: 49
End: 2024-02-05
Payer: COMMERCIAL

## 2024-02-28 ENCOUNTER — OFFICE VISIT (OUTPATIENT)
Dept: NEUROLOGY | Facility: MEDICAL CENTER | Age: 49
End: 2024-02-28
Payer: COMMERCIAL

## 2024-02-28 VITALS
RESPIRATION RATE: 18 BRPM | DIASTOLIC BLOOD PRESSURE: 64 MMHG | TEMPERATURE: 98.4 F | HEART RATE: 70 BPM | OXYGEN SATURATION: 97 % | BODY MASS INDEX: 25.16 KG/M2 | WEIGHT: 166.01 LBS | SYSTOLIC BLOOD PRESSURE: 112 MMHG | HEIGHT: 68 IN

## 2024-02-28 DIAGNOSIS — G50.0 TRIGEMINAL NEURALGIA OF LEFT SIDE OF FACE: ICD-10-CM

## 2024-02-28 PROCEDURE — 99202 OFFICE O/P NEW SF 15 MIN: CPT

## 2024-02-28 PROCEDURE — 99205 OFFICE O/P NEW HI 60 MIN: CPT

## 2024-02-28 RX ORDER — GABAPENTIN 300 MG/1
300 CAPSULE ORAL 3 TIMES DAILY
COMMUNITY

## 2024-02-28 RX ORDER — CARBAMAZEPINE 300 MG/1
300 CAPSULE, EXTENDED RELEASE ORAL 3 TIMES DAILY
Qty: 90 CAPSULE | Refills: 11 | Status: SHIPPED | OUTPATIENT
Start: 2024-02-28 | End: 2024-03-13 | Stop reason: SDUPTHER

## 2024-02-28 RX ORDER — CARBAMAZEPINE 300 MG/1
300 CAPSULE, EXTENDED RELEASE ORAL 2 TIMES DAILY
COMMUNITY
End: 2024-02-28 | Stop reason: SDUPTHER

## 2024-02-28 NOTE — PROGRESS NOTES
[facility]  GENERAL NEUROLOGY  NEW PATIENT VISIT    Referral source: David Ricci MD    CC: Trigeminal neuralgia    HISTORY OF ILLNESS:  Jenelle Morfin is a 48 y.o. woman with a history most notable for trigeminal neuralgia. It started 2022 with extreme ear pain also around the time that she had her third covid shot. She states that she had two pfizer covid shots and her last covid shot was moderna. She initially saw Dr. Darby who ruled out ear issues and referred her to Dr. Soares who diagnosed her with trigeminal neuralgia.  Today, Philomena provided the following history:    Trigeminal neuralgia  Left pain will start deep in the ear (severe ear infection) and radiating into jaw. Quality of pain is described as a stabbing pain, pressure, and tinnitus. Everyday she has constant pressure but the stabbing pain will occur when the medication wears off daily.  Pressure intensity is 4-7/10. Stabbing pain/ Tinnitus 10/10.  Stabbing pain and tinnitus episodes can last for 24-48 and occurs 2-3 times a week. She feels that the stabbing pain has been reduced the addition of gabapentin.  Her last episode was this last weekend. She denies a jolting/ electric pain in her face. She denies being able to reproduce the pain with physical touch. She does state she can reproduce with stress and wine. Pain tends to be more in the evening. She reports that using a heating pad will calm the stabbing pain and the tinnitus.       The following is a summary of headache symptoms, presented in my standard format:  Age at onset (years): 46 years old  Location: see above  Radiation: see above  Frequency: see above  Duration: see above  Headache Days/Month: December 30/30, January 30/30, February 28/28  Quality: see above  Intensity: see above  Aura: see above  Photophobia/Phonophobia/Nausea/Vomiting: no,no,no,no  Provoked by Physical Activity?: no  Triggers: see above  Associated Symptoms: no  Autonomic Signs (such as ptosis,  miosis, conjunctival injection, rhinorrhea, increased lacrimation): no  Head Trauma: no  Association with Menses:no  ED Visits: yes  Hospitalizations:   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  Sleep (hours/night):  approximately 7-8  Caffeine Intake: 2 cups of coffee  Hydration: yes  Nutrition: skips   Exercise:   Analgesic Overuse:     Current Medication Regimen:  - Carbamazepine 300 mg BID  - gabapentin 300 MG HS    Medications Tried: Response  Preventive:  -     Rescue:  -     Medications Not Tried:  -     MEDICAL AND SURGICAL HISTORY:  Past Medical History:   Diagnosis Date    Arthritis     Hips    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     ENDOMETRIAL ABLATION      HIP ARTHROSCOPY Right      MEDICATIONS:  Current Outpatient Medications   Medication Sig    acetaminophen (TYLENOL) 325 MG Tab Take 2 Tabs by mouth every 6 hours.    aspirin EC 81 MG EC tablet Take 1 Tab by mouth 2 times a day.    ondansetron (ZOFRAN ODT) 4 MG TABLET DISPERSIBLE Take 1 Tab by mouth every four hours as needed for Nausea.    docusate sodium 100 MG Cap Take 100 mg by mouth 2 Times a Day.    cyclobenzaprine (FLEXERIL) 10 MG Tab Take 1 Tab by mouth 2 times a day as needed for Muscle Spasms.    tramadol (ULTRAM) 50 MG Tab Take 50 mg by mouth 1 time daily as needed.    Multiple Vitamins-Minerals (MULTIVITAMIN PO) Take 1 Tab by mouth every day.     SOCIAL HISTORY:  Social History     Tobacco Use    Smoking status: Never     "Smokeless tobacco: Never   Substance Use Topics    Alcohol use: Yes     Alcohol/week: 1.8 oz     Types: 3 Glasses of wine per week     Comment: glass of wine most days of the week     Social History     Social History Narrative    Not on file     FAMILY HISTORY:  Family History   Problem Relation Age of Onset    Arthritis Mother         OA    Stroke Father         TIA     Arrythmia Father     Stroke Maternal Grandfather     Stroke Paternal Grandmother        Ambulatory Vitals  Encounter Vitals  Temperature: 36.9 °C (98.4 °F)  Temp src: Temporal  Blood Pressure: 112/64  Pulse: 70  Respiration: 18  Pulse Oximetry: 97 %  Weight: 75.3 kg (166 lb 0.1 oz)  Height: 172.7 cm (5' 8\")  BMI (Calculated): 25.24     REVIEW OF SYSTEMS:  A ROS was completed.  Pertinent positives and negatives were included in the HPI, above.  All other systems were reviewed and are negative.    PHYSICAL EXAM:  General/Medical:  Philomena presents clean and well-dressed.  She does not appear in any acute distress at this time.  She has no Maller rash.  She has good range of motion of her neck.  She reports no allodynia.  She reports no jaw claudication.  She does report jaw pain on the left that stems from her left ear.  Her left-sided facial pain is not reproduced with touch.  She has palpable radial pulses.  She has good capillary refill in the upper extremities.  Auscultation of her heart revealed a murmur with S1 and S2 and no abnormal sounds.  Vital signs are listed above and are within normal limits.    Neuro:  MENTAL STATUS: awake and alert; no deficits of speech or language; oriented to person, place, and time; affect was appropriate to situation    CRANIAL NERVES:    II: acuity: J1+/J1+-1, fields: intact to confrontation, pupils: 3/3 to 2/2 without a relative afferent pupillary defect, discs: sharp, no red desaturation noted  III/IV/VI: versions: intact without nystagmus    V: facial sensation: symmetric to light touch    VII: facial " "expression: symmetric    VIII: hearing: intact to finger rub, miller localized to the right ear, Rinne air conduction is greater than bone conduction but she reports muffled on the left ear    IX/X: palate: elevates symmetrically    XI: shoulder shrug: symmetric    XII: tongue: midline    MOTOR:  - bulk: normal throughout  - tone: normal throughout  Upper Extremity Strength  (R/L)    5/5   Elbow flexion 5/5   Elbow extension 5/5   Shoulder abduction 5/5     Lower Extremity Strength  (R/L)   Hip flexion 5/5   Knee extension 5/5   Knee flexion 5/5   Ankle plantarflexion 5/5   Ankle dorsiflexion 5/5     - pronator drift: absent  - abnormal movements: none    SENSATION:  - light touch: Intact  - vibration: Intact  - temperature: Inconsistent in the left lower extremity  - pinprick: NT  - proprioception: NT  - Romberg: absent    COORDINATION:  - finger to nose: normal, no ataxia on exam  - finger tapping: rapid and accurate, bilaterally  - foot tapping: Rapid and accurate, bilaterally    REFLEXES:  Reflex Right Left   BR 2+ 2+   Biceps 2+ 2+   Triceps 2+ 2+   Patellae 2+ 2+   Achilles Unable Unable   Toes NT NT     GAIT:  - narrow base and normal, with normal arm swing  - heel-walk: Deferred patient wearing shoes  - toe-walk: Intact  - tandem gait: intact    REVIEW OF IMAGING STUDIES: I reviewed the following studies:  MRI Brain:  Date: 5/22/2005  W/o and w/ contrast?: without  Indication: \"fall to the back of the head\"  Comparison: none  Impression:  \"1. LEFT POSTERIOR PARIETAL SCALP SWELLING.   2. NO ACUTE  INTRACRANIAL ABNORMALITY IDENTIFIED.      \"    REVIEW OF LABORATORY STUDIES:  No pertinent labs    ASSESSMENT& PLAN:  1. Trigeminal neuralgia of left side of face  Philomena presents with her  with ongoing trigeminal neuralgia.  Her presentation of trigeminal neuralgia is atypical.  She states that the pain starts in her left ear with a feeling of an acute earache and will radiate down into her left jaw.  She " denies any electrical jolts.  She describes the pain as a pressure that is constant every day and a range of 4-7 out of 10 that will become stabbing pain with tinnitus which is 10 out of 10.  The stabbing pain tinnitus episodes are 2-3 times a week and can last for 24 to 48 hours.  She is currently on carbamazepine 300 mg twice daily and gabapentin 300 mg at night.  She does not like the gabapentin she feels that this is contributing to her somnolence and brain fog.  She is curious wondering if her symptoms which started abruptly in 2022 was associated with her last COVID shot from [facility].  Her neurological exam revealed a Gil test which localized to the right ear, normal grind test but she reports very muffled hearing in the left ear.  She had no other concerning findings.  She has had an MRI and an MRA of her head both at [facility] we will try and get the results of those.  She is also had recent labs at [facility] we will try and get those results as well.  Currently she would like to get rid of her gabapentin as she is not liking the side effects.  We discussed increasing her carbamazepine to 3 times a day to see how she feels and if she does not like it she can go back to her previous carbamazepine gabapentin regimen.  We also discussed a referral to [facility] for further evaluation of her trigeminal neuralgia and possible cause from a COVID-vaccine.  Philomena is in agreement to referral to [facility] and increasing her carbamazepine.  I would like to look at her electrolytes again in about a month after she has been on carbamazepine to make sure that they are stable.  Philomena can contact me via [employer] with any updates, concerns, or questions.  I would like to see her back in 3 months to discuss current medication regimen and its effect on her atypical trigeminal neuralgia.  If she is able to get into [facility] in that time then she can push out her appointment with me.    - carbamazepine (CARBATROL) 300 MG CR  capsule; Take 1 Capsule by mouth in the morning, at noon, and at bedtime.  Dispense: 90 Capsule; Refill: 11  - Comp Metabolic Panel; Future  - Referral to Neurology     BILLING DOCUMENTATION:   I spent 60 minutes in patient care. This includes time with chart review, pre-charting, records review, discussions with other healthcare providers, and documentation. This also includes face-to-face time with the patient for: exam review, discussion and treatment planning.     Any Goodman MSN APRN-CNP  [facility] Neurology [place]

## 2024-03-13 DIAGNOSIS — G50.0 TRIGEMINAL NEURALGIA OF LEFT SIDE OF FACE: ICD-10-CM

## 2024-03-13 RX ORDER — CARBAMAZEPINE 300 MG/1
300 CAPSULE, EXTENDED RELEASE ORAL 3 TIMES DAILY
Qty: 90 CAPSULE | Refills: 11 | Status: SHIPPED | OUTPATIENT
Start: 2024-03-13

## 2024-03-13 NOTE — TELEPHONE ENCOUNTER
Crissy Oneal. Can you please resend RX to [facility] pharmacy? Thank you. WILLIAN Sprague.

## 2024-05-28 NOTE — PROGRESS NOTES
[facility]  GENERAL NEUROLOGY  NEW PATIENT VISIT    Referral source: David Ricci MD    CC: Trigeminal neuralgia    HISTORY OF ILLNESS:  Jenelle Morfin is a 48 y.o. woman with a history most notable for trigeminal neuralgia. She is here for follow up from her visit 2/28/24. She was referred to [facility] at that appointment but her insurance requires a referral from her primary provider. She was started on  carbamazepine to replace her gabapentin. Today, Philomena provided the following  interm history:    5/29/24-She is still having consistent pain in the left ear. She is currently taking carbamazepine 300 mg tid and gabapentin 300 mg at night. She still having to sleep with a heating pad for pain relief. Her insurance will not allow her to go  out of state for evaluation by [facility] or [facility].       Below is information gathered from appointment 2/28/24  Trigeminal neuralgia  Left pain will start deep in the ear (severe ear infection) and radiating into jaw. Quality of pain is described as a stabbing pain, pressure, and tinnitus. Everyday she has constant pressure but the stabbing pain will occur when the medication wears off daily.  Pressure intensity is 4-7/10. Stabbing pain/ Tinnitus 10/10.  Stabbing pain and tinnitus episodes can last for 24-48 and occurs 2-3 times a week. She feels that the stabbing pain has been reduced the addition of gabapentin.  Her last episode was this last weekend. She denies a jolting/ electric pain in her face. She denies being able to reproduce the pain with physical touch. She does state she can reproduce with stress and wine. Pain tends to be more in the evening. She reports that using a heating pad will calm the stabbing pain and the tinnitus.       The following is a summary of headache symptoms, presented in my standard format:  Age at onset (years): 46 years old  Location: see above  Radiation: see above  Frequency: see above  Duration: see above  Headache Days/Month:  December 30/30, January 30/30, February 28/28  Quality: see above  Intensity: see above  Aura: see above  Photophobia/Phonophobia/Nausea/Vomiting: no,no,no,no  Provoked by Physical Activity?: no  Triggers: see above  Associated Symptoms: no  Autonomic Signs (such as ptosis, miosis, conjunctival injection, rhinorrhea, increased lacrimation): no  Head Trauma: no  Association with Menses:no  ED Visits: yes  Hospitalizations:   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  Sleep (hours/night):  approximately 7-8  Caffeine Intake: 2 cups of coffee  Hydration: yes  Nutrition: skips   Exercise:   Analgesic Overuse:     Current Medication Regimen:  - Carbamazepine 300 mg TID  - gabapentin 300 MG HS    Medications Tried: Response  Preventive:  -     Rescue:  -     Medications Not Tried:  -     MEDICAL AND SURGICAL HISTORY:  Past Medical History:   Diagnosis Date    Arthritis     Hips    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     ENDOMETRIAL ABLATION      HIP ARTHROSCOPY Right      MEDICATIONS:  Current Outpatient Medications   Medication Sig    carbamazepine (CARBATROL) 300 MG CR capsule Take 1 Capsule by mouth in the morning, at noon, and at bedtime.    gabapentin (NEURONTIN) 300 MG Cap Take 300 mg by mouth 3 times a day.    Multiple Vitamins-Minerals (MULTIVITAMIN PO) Take 1 Tab by mouth every day.     SOCIAL HISTORY:  Social History     Tobacco Use    Smoking status: Never    Smokeless tobacco: Never   Substance  Use Topics    Alcohol use: Yes     Alcohol/week: 1.8 oz     Types: 3 Glasses of wine per week     Comment: glass of wine most days of the week     Social History     Social History Narrative    Not on file     FAMILY HISTORY:  Family History   Problem Relation Age of Onset    Arthritis Mother         OA    Stroke Father         TIA     Arrythmia Father     Stroke Maternal Grandfather     Stroke Paternal Grandmother        Ambulatory Vitals    REVIEW OF SYSTEMS:  A ROS was completed.  Pertinent positives and negatives were included in the HPI, above.  All other systems were reviewed and are negative.    PHYSICAL EXAM:  General/Medical:  Philomena presents clean and well-dressed.  She does not appear in any acute distress at this time.  She has no Maller rash.  She has good range of motion of her neck.  She reports no allodynia.  She reports no jaw claudication.  She does report jaw pain on the left that stems from her left ear.  Her left-sided facial pain is not reproduced with touch.  She has palpable radial pulses.  She has good capillary refill in the upper extremities.  Auscultation of her heart revealed a murmur with S1 and S2 and no abnormal sounds.  Vital signs are listed above and are within normal limits.    Neuro:  MENTAL STATUS: awake and alert; no deficits of speech or language; oriented to person, place, and time; affect was appropriate to situation    CRANIAL NERVES:    II: acuity: NT  III/IV/VI: versions: intact without nystagmus    V: facial sensation: symmetric to light touch    VII: facial expression: symmetric    VIII: hearing: NT    IX/X: palate: elevates symmetrically    XI: shoulder shrug: symmetric    XII: tongue: midline    MOTOR:  - bulk: normal throughout  - tone: normal throughout  Upper Extremity Strength  (R/L)    NT   Elbow flexion NT   Elbow extension NT   Shoulder abduction NT     Lower Extremity Strength  (R/L)   Hip flexion NT   Knee extension NT   Knee flexion NT   Ankle  plantarflexion NT   Ankle dorsiflexion NT     - pronator drift: absent  - abnormal movements: none    SENSATION:  - light touch: NT  - vibration: NT  - temperature: NT  - pinprick: NT  - proprioception: NT  - Romberg: NT    COORDINATION:  - finger to nose: NT  - finger tapping: NT  - foot tapping: NT    REFLEXES:  Reflex Right Left   BR NT NT   Biceps NT NT   Triceps NT NT   Patellae NT NT   Achilles NT NT   Toes NT NT     GAIT:  - narrow base and normal, with normal arm swing  - heel-walk: NT  - toe-walk: NT  - tandem gait:NT    REVIEW OF IMAGING STUDIES: I reviewed the following studies:  MRI Brain and IAC:  Date:5/3/23  W/o and w/ contrast?: with and without  Indication: Trigeminal neuralgia, tinnitus  Comparison: 11/3/2022  Impression:  1. No acute intracranial abnormality. No vasogenic edema, space-occupying lesion, or pathologic enhancement.  2. No MRI abnormality of the brainstem or internal auditory canals.  3. Minimal burden chronic small vessel ischemic disease of the white matter which is unchanged.  4. Minimal paranasal sinus disease.    MRA of BRAIN:  Date:5/3/23  Without or with contrast:without  Indication:Trigeminal neuralgia, tinnitus, unspecified ear  Comparison:brain mri 11/3/22  Impression:  Normal brain MR angiogram.    REVIEW OF LABORATORY STUDIES:  5/9/24 CMP WNL except AG ratio 2.3    ASSESSMENT& PLAN:  1. Trigeminal neuralgia of left side of face  Philomena is here to follow-up for current medication regimen and its effect on her neuralgia of the left side of her face.  Currently she is taking carbamazepine 300 mg 3 times a day and 300 mg of gabapentin at night sometimes increasing it to 2 at night if she is having bad flareup days.  She is currently still sleeping with a heating pad on the left side of her face.  She still reports having pain 2-3 times a week with dull residual pain daily.  Initially at her last appointment I referred her to [facility] but her insurance will not allow her to see a  neurosurgeon outside of [place].  We discussed ruling out geniculate neuralgia which could be more aligned with her symptom presentation.  I will order an MRI of her brain with thin cuts of her brainstem.  I will also order physical therapy of her cranial maxillary facial region.  We discussed adding baclofen to her current medication regimen for better coverage of her neuralgia symptoms.  She is agreeable to this we will titrate up slowly to avoid possible oversedation.  We discussed possibly referring her locally to Dr. Tracy with [facility] neurosurgery group who specializes in trigeminal neuralgia.  Philomena would like to try physical therapy, the new brain MRI, and baclofen prior to the referral.  She will contact me via [employer] with some updates on how she is doing with the baclofen so I can refill medication.  Otherwise she can contact me with updates, concerns, or questions anytime and I will see her back in 4 months after she is initiated physical therapy.  - MR-BRAIN-WITH & W/O; Future  - baclofen (LIORESAL) 5 MG Tab; Take 1 Tablet by mouth every evening for 7 days, THEN 2 Tablets every evening for 7 days, THEN 3 Tablets every evening for 7 days, THEN 4 Tablets every evening for 30 days.  Dispense: 162 Tablet; Refill: 0  - Referral to Physical Therapy  - gabapentin (NEURONTIN) 300 MG Cap; Take 1 Capsule by mouth at bedtime.  Dispense: 30 Capsule; Refill: 11     BILLING DOCUMENTATION:   I spent 40 minutes in patient care. This includes time with chart review, pre-charting, records review, discussions with other healthcare providers, and documentation. This also includes face-to-face time with the patient for: exam review, discussion and treatment planning.     Any Goodman MSN APRN-CNP  [facility] Neurology [place]

## 2024-05-29 ENCOUNTER — OFFICE VISIT (OUTPATIENT)
Dept: NEUROLOGY | Facility: MEDICAL CENTER | Age: 49
End: 2024-05-29
Payer: COMMERCIAL

## 2024-05-29 VITALS
BODY MASS INDEX: 24.59 KG/M2 | OXYGEN SATURATION: 98 % | SYSTOLIC BLOOD PRESSURE: 110 MMHG | TEMPERATURE: 97.6 F | WEIGHT: 166.01 LBS | DIASTOLIC BLOOD PRESSURE: 82 MMHG | HEART RATE: 58 BPM | HEIGHT: 69 IN

## 2024-05-29 DIAGNOSIS — G50.0 TRIGEMINAL NEURALGIA OF LEFT SIDE OF FACE: ICD-10-CM

## 2024-05-29 RX ORDER — BACLOFEN 5 MG/1
TABLET ORAL
Qty: 162 TABLET | Refills: 0 | Status: SHIPPED | OUTPATIENT
Start: 2024-05-29 | End: 2024-07-19

## 2024-05-29 RX ORDER — GABAPENTIN 300 MG/1
300 CAPSULE ORAL
Qty: 30 CAPSULE | Refills: 11 | Status: SHIPPED | OUTPATIENT
Start: 2024-05-29

## 2024-05-29 ASSESSMENT — PATIENT HEALTH QUESTIONNAIRE - PHQ9: CLINICAL INTERPRETATION OF PHQ2 SCORE: 0

## 2024-07-23 DIAGNOSIS — G50.0 TRIGEMINAL NEURALGIA OF LEFT SIDE OF FACE: ICD-10-CM

## 2024-07-23 RX ORDER — BACLOFEN 10 MG/1
20 TABLET ORAL
Qty: 60 TABLET | Refills: 11 | Status: SHIPPED | OUTPATIENT
Start: 2024-07-23

## 2024-09-24 NOTE — PROGRESS NOTES
[facility]  GENERAL NEUROLOGY  FOLLOW UP VISIT    HISTORY OF ILLNESS:  Jenelle Morfin is a 48 y.o. woman with a history most notable for trigeminal neuralgia.  She is here for follow-up to discuss her current medication regimen and its effect on her trigeminal neuralgia.  Today, Philomena provided the following  interm history:    9/24/24- she is reporting some increased dizziness. Last Sunday she was sitting drinking her coffee. She started feeling warm and clammy. She started getting nauseous. She vomited moved to a shady area and passed out. She then became responsive and started throwing up again. She is reporting increased stress that will flair up her ear. She is still getting flares every two weeks but it is down for days.     5/29/24-She is still having consistent pain in the left ear. She is currently taking carbamazepine 300 mg tid and gabapentin 300 mg at night. She still having to sleep with a heating pad for pain relief. Her insurance will not allow her to go  out of state for evaluation by [facility] or [facility].       Below is information gathered from appointment 2/28/24  Trigeminal neuralgia  Left pain will start deep in the ear (severe ear infection) and radiating into jaw. Quality of pain is described as a stabbing pain, pressure, and tinnitus. Everyday she has constant pressure but the stabbing pain will occur when the medication wears off daily.  Pressure intensity is 4-7/10. Stabbing pain/ Tinnitus 10/10.  Stabbing pain and tinnitus episodes can last for 24-48 and occurs 2-3 times a week. She feels that the stabbing pain has been reduced the addition of gabapentin.  Her last episode was this last weekend. She denies a jolting/ electric pain in her face. She denies being able to reproduce the pain with physical touch. She does state she can reproduce with stress and wine. Pain tends to be more in the evening. She reports that using a heating pad will calm the stabbing pain and the  tinnitus.       The following is a summary of headache symptoms, presented in my standard format:  Age at onset (years): 46 years old  Location: see above  Radiation: see above  Frequency: see above  Duration: see above  Headache Days/Month: December 30/30, January 30/30, February 28/28  Quality: see above  Intensity: see above  Aura: see above  Photophobia/Phonophobia/Nausea/Vomiting: no,no,no,no  Provoked by Physical Activity?: no  Triggers: see above  Associated Symptoms: no  Autonomic Signs (such as ptosis, miosis, conjunctival injection, rhinorrhea, increased lacrimation): no  Head Trauma: no  Association with Menses:no  ED Visits: yes  Hospitalizations:   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  Sleep (hours/night):  approximately 7-8  Caffeine Intake: 2 cups of coffee  Hydration: yes  Nutrition: skips   Exercise:   Analgesic Overuse:     Current Medication Regimen:  - Carbamazepine 300 mg TID  - gabapentin 300 MG HS    Medications Tried: Response  Preventive:  -     Rescue:  -     Medications Not Tried:  -     MEDICAL AND SURGICAL HISTORY:  Past Medical History:   Diagnosis Date    Arthritis     Hips    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     ENDOMETRIAL ABLATION      HIP ARTHROSCOPY Right      MEDICATIONS:  Current Outpatient Medications   Medication Sig    baclofen (LIORESAL) 10 MG Tab Take 2 Tablets by mouth at bedtime.    gabapentin (NEURONTIN) 300 MG Cap Take 1 Capsule by mouth at  "bedtime.    carbamazepine (CARBATROL) 300 MG CR capsule Take 1 Capsule by mouth in the morning, at noon, and at bedtime.    Multiple Vitamins-Minerals (MULTIVITAMIN PO) Take 1 Tab by mouth every day.     SOCIAL HISTORY:  Social History     Tobacco Use    Smoking status: Never    Smokeless tobacco: Never   Substance Use Topics    Alcohol use: Yes     Alcohol/week: 1.8 oz     Types: 3 Glasses of wine per week     Comment: glass of wine most days of the week     Social History     Social History Narrative    Not on file     FAMILY HISTORY:  Family History   Problem Relation Age of Onset    Arthritis Mother         OA    Stroke Father         TIA     Arrythmia Father     Stroke Maternal Grandfather     Stroke Paternal Grandmother        Ambulatory Vitals  Encounter Vitals  Temperature: 37 °C (98.6 °F)  Temp src: Temporal  Blood Pressure: 114/68  Pulse: 63  Respiration: 14  Pulse Oximetry: 97 %  Weight: 75.6 kg (166 lb 10.7 oz)  Height: 172.7 cm (5' 8\") (pt reported)  BMI (Calculated): 25.34     REVIEW OF SYSTEMS:  A ROS was completed.  Pertinent positives and negatives were included in the HPI, above.  All other systems were reviewed and are negative.    PHYSICAL EXAM:  General/Medical:  Philomena presents clean and well-dressed.  She does not appear in any acute distress at this time.  She has no Maller rash.  She has good range of motion of her neck.  She reports no allodynia.  She reports no jaw claudication.  She does report jaw pain on the left that stems from her left ear.  Her left-sided facial pain is not reproduced with touch. Vital signs are listed above and are within normal limits.    Neuro:  MENTAL STATUS: awake and alert; no deficits of speech or language; oriented to person, place, and time; affect was appropriate to situation    CRANIAL NERVES:    II: acuity: NT  III/IV/VI: versions: intact without nystagmus    V: facial sensation: symmetric to light touch    VII: facial expression: symmetric    VIII: " hearing: NT    IX/X: palate: elevates symmetrically    XI: shoulder shrug: symmetric    XII: tongue: midline    MOTOR:  - bulk: normal throughout  - tone: normal throughout  Upper Extremity Strength  (R/L)    NT   Elbow flexion NT   Elbow extension NT   Shoulder abduction NT     Lower Extremity Strength  (R/L)   Hip flexion NT   Knee extension NT   Knee flexion NT   Ankle plantarflexion NT   Ankle dorsiflexion NT     - pronator drift: absent  - abnormal movements: none    SENSATION:  - light touch: NT  - vibration: NT  - temperature: NT  - pinprick: NT  - proprioception: NT  - Romberg: NT    COORDINATION:  - finger to nose: NT  - finger tapping: NT  - foot tapping: NT    REFLEXES:  Reflex Right Left   BR NT NT   Biceps NT NT   Triceps NT NT   Patellae NT NT   Achilles NT NT   Toes NT NT     GAIT:  - narrow base and normal, with normal arm swing  - heel-walk: NT  - toe-walk: NT  - tandem gait:NT    REVIEW OF IMAGING STUDIES: I reviewed the following studies:  MRI Brain and IAC:  Date:5/3/23  W/o and w/ contrast?: with and without  Indication: Trigeminal neuralgia, tinnitus  Comparison: 11/3/2022  Impression:  1. No acute intracranial abnormality. No vasogenic edema, space-occupying lesion, or pathologic enhancement.  2. No MRI abnormality of the brainstem or internal auditory canals.  3. Minimal burden chronic small vessel ischemic disease of the white matter which is unchanged.  4. Minimal paranasal sinus disease.    MRA of BRAIN:  Date:5/3/23  Without or with contrast:without  Indication:Trigeminal neuralgia, tinnitus, unspecified ear  Comparison:brain mri 11/3/22  Impression:  Normal brain MR angiogram.    REVIEW OF LABORATORY STUDIES:  5/9/24 CMP WNL except AG ratio 2.3    ASSESSMENT& PLAN:  1. Trigeminal neuralgia of left side of face  Philomena presents for follow-up to discuss her current medication regimen and its effect on her trigeminal neuralgia.  Currently she is on carbamazepine 300 mg 3 times a day,  gabapentin 300 mg at night, and baclofen 20 mg at night.  She reports that with addition of baclofen she has better coverage of her exacerbations.  However she still will get an exacerbation every week or 2 that can last for days.  She still sleeps with a heating pad on the left side of her face which does help with pain.  She has been unable to get the MRI due to the high co-pay with her insurance.  She and her  had questions regarding whether her hormones and menopause could be contributing to some of her complaints.  She does not have an OB and will need a referral.  I will refer her to OB/GYN to check her hormones and see if that is possible correlation to her symptoms.  She also has been doing some research and would like to know if craniofacial physical therapy might be beneficial for her.  We did discuss that cranial physical therapy can be quite beneficial for trigeminal neuralgia.  Imelda justice does cranial facial physical therapy so I will refer her there to see if her insurance will cover.  We also discussed instead of an MRI trying to get an MRA looking for vessel versus nerve impingement which could be resolved with microvascular decompression.  Philomena and her  are agreeable to an MRA of her head with the option of seeing Dr. Tracy in neurosurgery for decompression options.  Philomena also reports having increased brain fog with the gabapentin 300 mg at night.  We discussed switching gabapentin down to 201 100 or splitting it throughout the day to see if she has better coverage of her pain without the brain fog.  She is agreeable to splitting gabapentin throughout the day.  I will send in gabapentin 100 mg tablets for her to split throughout the day and see if she can find better coverage without the brain fog.  She can contact me via [employer] with any updates, concerns, or questions.  Due to her high co-pay I will have her focus on the other physical therapy and orders prior to deciding on  a return date for her to see me.  - Referral to OB/Gyn  - Referral to Physical Therapy  - MR-MRA HEAD-W/O; Future  - gabapentin (NEURONTIN) 100 MG Cap; Take 3 Capsules by mouth every evening for 30 days, THEN 2 Capsules every evening for 30 days, THEN 1 Capsule every evening for 30 days.  Dispense: 180 Capsule; Refill: 0    2. Syncope, unspecified syncope type  Philomena is also reporting a syncopal episode that has occurred twice.  Once she associated with her use of gabapentin and alcohol.  The second occurred last Sunday.  She reports sitting and drinking her coffee when she started having a hot sensation creeping over her body.  She started getting clammy and started getting nauseous.  She asked her  for some water and started vomiting.  When he went inside he observed her slumping over in her chair.  He was able to revive her within a minute and she started vomiting again.  She does report drinking an alcoholic beverage the night before but it seems unlikely that it contributed to this episode.  She does report being under a lot of stress it is possible that this is more of a vasovagal episode.  We discussed a referral for an EEG to rule out seizure-like activity.  We also discussed keeping an eye on her blood pressure and heart rate to see if hypotension or bradycardia is also a contributing factor.  She can contact me via [email or web address] with any updates, concerns, or questions.  Otherwise she will follow-up with me after she starts physical therapy and gets the MRI of her head.    BILLING DOCUMENTATION:   I spent 40 minutes in patient care. This includes time with chart review, pre-charting, records review, discussions with other healthcare providers, and documentation. This also includes face-to-face time with the patient for: exam review, discussion and treatment planning.      Any Goodman MSN APRN-CNP  [facility] [place]

## 2024-09-27 ENCOUNTER — OFFICE VISIT (OUTPATIENT)
Dept: NEUROLOGY | Facility: MEDICAL CENTER | Age: 49
End: 2024-09-27
Payer: COMMERCIAL

## 2024-09-27 VITALS
SYSTOLIC BLOOD PRESSURE: 114 MMHG | TEMPERATURE: 98.6 F | HEART RATE: 63 BPM | WEIGHT: 166.67 LBS | RESPIRATION RATE: 14 BRPM | BODY MASS INDEX: 25.26 KG/M2 | HEIGHT: 68 IN | DIASTOLIC BLOOD PRESSURE: 68 MMHG | OXYGEN SATURATION: 97 %

## 2024-09-27 DIAGNOSIS — R55 SYNCOPE, UNSPECIFIED SYNCOPE TYPE: ICD-10-CM

## 2024-09-27 DIAGNOSIS — G50.0 TRIGEMINAL NEURALGIA OF LEFT SIDE OF FACE: ICD-10-CM

## 2024-09-27 PROCEDURE — 99211 OFF/OP EST MAY X REQ PHY/QHP: CPT

## 2024-09-27 RX ORDER — GABAPENTIN 100 MG/1
CAPSULE ORAL
Qty: 180 CAPSULE | Refills: 0 | Status: SHIPPED | OUTPATIENT
Start: 2024-09-27 | End: 2024-12-26

## 2024-09-27 ASSESSMENT — PATIENT HEALTH QUESTIONNAIRE - PHQ9: CLINICAL INTERPRETATION OF PHQ2 SCORE: 0

## 2024-10-08 ENCOUNTER — GYNECOLOGY VISIT (OUTPATIENT)
Dept: OBGYN | Facility: CLINIC | Age: 49
End: 2024-10-08
Payer: COMMERCIAL

## 2024-10-08 VITALS
DIASTOLIC BLOOD PRESSURE: 76 MMHG | HEIGHT: 69 IN | SYSTOLIC BLOOD PRESSURE: 122 MMHG | BODY MASS INDEX: 24.72 KG/M2 | WEIGHT: 166.9 LBS

## 2024-10-08 DIAGNOSIS — N95.1 PERIMENOPAUSAL: Primary | ICD-10-CM

## 2024-10-08 DIAGNOSIS — G50.0 TRIGEMINAL NEURALGIA: ICD-10-CM

## 2024-10-08 DIAGNOSIS — R63.5 WEIGHT GAIN: ICD-10-CM

## 2024-10-08 PROCEDURE — 3078F DIAST BP <80 MM HG: CPT | Performed by: OBSTETRICS & GYNECOLOGY

## 2024-10-08 PROCEDURE — 3074F SYST BP LT 130 MM HG: CPT | Performed by: OBSTETRICS & GYNECOLOGY

## 2024-10-08 PROCEDURE — 99203 OFFICE O/P NEW LOW 30 MIN: CPT | Performed by: OBSTETRICS & GYNECOLOGY

## 2024-10-09 ENCOUNTER — HOSPITAL ENCOUNTER (OUTPATIENT)
Dept: RADIOLOGY | Facility: MEDICAL CENTER | Age: 49
End: 2024-10-09
Attending: STUDENT IN AN ORGANIZED HEALTH CARE EDUCATION/TRAINING PROGRAM
Payer: COMMERCIAL

## 2024-10-09 DIAGNOSIS — Z12.31 VISIT FOR SCREENING MAMMOGRAM: ICD-10-CM

## 2024-10-09 PROCEDURE — 77067 SCR MAMMO BI INCL CAD: CPT

## 2024-10-10 DIAGNOSIS — R92.1 BREAST CALCIFICATION, LEFT: ICD-10-CM

## 2024-11-08 ENCOUNTER — HOSPITAL ENCOUNTER (OUTPATIENT)
Dept: RADIOLOGY | Facility: MEDICAL CENTER | Age: 49
End: 2024-11-08
Attending: OBSTETRICS & GYNECOLOGY
Payer: COMMERCIAL

## 2024-11-08 DIAGNOSIS — R92.8 ABNORMAL MAMMOGRAM: ICD-10-CM

## 2024-11-08 LAB
T4 FREE SERPL-MCNC: 0.83 NG/DL (ref 0.82–1.77)
TSH SERPL DL<=0.005 MIU/L-ACNC: 2.46 UIU/ML (ref 0.45–4.5)

## 2024-11-08 PROCEDURE — 77065 DX MAMMO INCL CAD UNI: CPT | Mod: LT

## 2024-11-10 ENCOUNTER — HOSPITAL ENCOUNTER (OUTPATIENT)
Dept: RADIOLOGY | Facility: MEDICAL CENTER | Age: 49
End: 2024-11-10
Payer: COMMERCIAL

## 2024-11-10 DIAGNOSIS — G50.0 TRIGEMINAL NEURALGIA OF LEFT SIDE OF FACE: ICD-10-CM

## 2024-11-10 PROCEDURE — 70544 MR ANGIOGRAPHY HEAD W/O DYE: CPT

## 2024-11-11 DIAGNOSIS — R45.89 ANXIETY ABOUT TREATMENT: ICD-10-CM

## 2024-11-11 DIAGNOSIS — R92.1 BREAST CALCIFICATION, LEFT: ICD-10-CM

## 2024-11-11 RX ORDER — ALPRAZOLAM 0.25 MG/1
0.25 TABLET ORAL
Qty: 2 TABLET | Refills: 0 | Status: SHIPPED | OUTPATIENT
Start: 2024-11-11 | End: 2024-11-13

## 2024-11-13 ENCOUNTER — HOSPITAL ENCOUNTER (OUTPATIENT)
Dept: RADIOLOGY | Facility: MEDICAL CENTER | Age: 49
End: 2024-11-13
Attending: STUDENT IN AN ORGANIZED HEALTH CARE EDUCATION/TRAINING PROGRAM
Payer: COMMERCIAL

## 2024-11-13 DIAGNOSIS — R92.8 ABNORMAL FINDING ON BREAST IMAGING: ICD-10-CM

## 2024-11-13 LAB — PATHOLOGY CONSULT NOTE: NORMAL

## 2024-11-13 PROCEDURE — 88305 TISSUE EXAM BY PATHOLOGIST: CPT

## 2024-11-13 PROCEDURE — 19082 BX BREAST ADD LESION STRTCTC: CPT | Mod: LT

## 2024-11-13 PROCEDURE — 77065 DX MAMMO INCL CAD UNI: CPT | Mod: LT

## 2024-11-14 ENCOUNTER — TELEPHONE (OUTPATIENT)
Dept: RADIOLOGY | Facility: MEDICAL CENTER | Age: 49
End: 2024-11-14
Payer: COMMERCIAL

## 2024-11-14 NOTE — TELEPHONE ENCOUNTER
Patient received results from Dr. Paulino.     I called and faxed ordering provider's office regarding results/surgical referral    Placed referral to nurse navigator, provided pt with contact info         Pt aware of the above info and to contact her doctor

## 2024-11-20 ENCOUNTER — GYNECOLOGY VISIT (OUTPATIENT)
Dept: OBGYN | Facility: CLINIC | Age: 49
End: 2024-11-20
Payer: COMMERCIAL

## 2024-11-20 ENCOUNTER — HOSPITAL ENCOUNTER (OUTPATIENT)
Facility: MEDICAL CENTER | Age: 49
End: 2024-11-20
Attending: OBSTETRICS & GYNECOLOGY
Payer: COMMERCIAL

## 2024-11-20 ENCOUNTER — OFFICE VISIT (OUTPATIENT)
Dept: SURGERY | Facility: MEDICAL CENTER | Age: 49
End: 2024-11-20
Payer: COMMERCIAL

## 2024-11-20 VITALS
DIASTOLIC BLOOD PRESSURE: 78 MMHG | SYSTOLIC BLOOD PRESSURE: 129 MMHG | WEIGHT: 168.2 LBS | HEIGHT: 68 IN | BODY MASS INDEX: 25.49 KG/M2

## 2024-11-20 VITALS
TEMPERATURE: 98 F | SYSTOLIC BLOOD PRESSURE: 117 MMHG | HEART RATE: 65 BPM | OXYGEN SATURATION: 93 % | BODY MASS INDEX: 25.46 KG/M2 | WEIGHT: 168 LBS | HEIGHT: 68 IN | DIASTOLIC BLOOD PRESSURE: 86 MMHG

## 2024-11-20 DIAGNOSIS — R10.2 PELVIC PAIN: ICD-10-CM

## 2024-11-20 DIAGNOSIS — Z01.419 WELL WOMAN EXAM WITH ROUTINE GYNECOLOGICAL EXAM: ICD-10-CM

## 2024-11-20 DIAGNOSIS — N60.92 ATYPICAL DUCTAL HYPERPLASIA OF LEFT BREAST: ICD-10-CM

## 2024-11-20 DIAGNOSIS — N80.9 ENDOMETRIOSIS: ICD-10-CM

## 2024-11-20 DIAGNOSIS — G50.0 TRIGEMINAL NEURALGIA: ICD-10-CM

## 2024-11-20 PROCEDURE — 3074F SYST BP LT 130 MM HG: CPT | Performed by: OBSTETRICS & GYNECOLOGY

## 2024-11-20 PROCEDURE — 87491 CHLMYD TRACH DNA AMP PROBE: CPT

## 2024-11-20 PROCEDURE — 87591 N.GONORRHOEAE DNA AMP PROB: CPT

## 2024-11-20 PROCEDURE — 3079F DIAST BP 80-89 MM HG: CPT | Performed by: SURGERY

## 2024-11-20 PROCEDURE — 3078F DIAST BP <80 MM HG: CPT | Performed by: OBSTETRICS & GYNECOLOGY

## 2024-11-20 PROCEDURE — 99205 OFFICE O/P NEW HI 60 MIN: CPT | Performed by: SURGERY

## 2024-11-20 PROCEDURE — 88142 CYTOPATH C/V THIN LAYER: CPT

## 2024-11-20 PROCEDURE — 99396 PREV VISIT EST AGE 40-64: CPT | Performed by: OBSTETRICS & GYNECOLOGY

## 2024-11-20 PROCEDURE — 3074F SYST BP LT 130 MM HG: CPT | Performed by: SURGERY

## 2024-11-20 ASSESSMENT — ENCOUNTER SYMPTOMS
BACK PAIN: 1
DIAPHORESIS: 1

## 2024-11-20 NOTE — PROGRESS NOTES
Patient here for annual exam.   Last pap done/results : does not remember  LMP : ablation  Last Mammogram : 11/8/24    Pt states she would like to have her labs reviewed.    Phone/Pharmacy verified   [phone]

## 2024-11-20 NOTE — PROGRESS NOTES
Annual Exam    CC:   Annual Exam      HPI:   Patient is a 49 y.o.  who presents for annual exam.  The patient denies gynecological complaints with exception of persistent intermittent left lower quadrant pain.  This has not improved or worsened over the last several weeks.  She was previously seen for perimenopausal complaints.  She is status post an endometrial ablation.  At that time she was scheduled for a mammogram and stereotactic biopsy which ultimately returned atypical ductal hyperplasia.  She is scheduled for surgical biopsy.    The patient also reported a history of endometriosis as a teenager and has  improved over the time.    Significant past medical history includes a history of a repaired congenital heart defect.      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     ENDOMETRIAL ABLATION      HIP ARTHROSCOPY Right        FAMILY HISTORY:  Family History   Problem Relation Age of Onset     "Breast Cancer Mother     Arthritis Mother         OA    Stroke Father         TIA     Arrythmia Father     Stroke Maternal Grandfather     Stroke Paternal Grandmother        MEDICATIONS:  Current Outpatient Medications   Medication Sig    baclofen (LIORESAL) 10 MG Tab Take 2 Tablets by mouth at bedtime.    gabapentin (NEURONTIN) 300 MG Cap Take 1 Capsule by mouth at bedtime.    carbamazepine (CARBATROL) 300 MG CR capsule Take 1 Capsule by mouth in the morning, at noon, and at bedtime.    Multiple Vitamins-Minerals (MULTIVITAMIN PO) Take 1 Tab by mouth every day.       ALLERGIES / REACTIONS:  Allergies   Allergen Reactions    Demerol Vomiting       SOCIAL HISTORY:   reports that she has never smoked. She has never used smokeless tobacco. She reports that she does not currently use alcohol after a past usage of about 0.6 oz of alcohol per week. She reports current drug use. Drugs: Marijuana and Inhaled.    ROS:   Positive ROS: See HPI  Gen: no fevers or chills, no significant weight loss or gain, excessive fatigue  Respiratory:  no cough or dyspnea  Cardiac:  no chest pain, no palpitations, no syncope, see past medical history  Breast: See HPI  GI:  no heartburn, no abdominal pain, no nausea or vomiting  Gyn: See HPI  Urinary: no dysuria, urgency, frequency, incontinence   Psych: no depression or anxiety  Neuro: no migraines with aura, fainting spells, numbness or tingling  Extremities: no joint pain, persistently swollen ankles, recurrent leg cramps       PHYSICAL EXAMINATION:  Vital Signs: /78 (BP Location: Right arm, Patient Position: Sitting, BP Cuff Size: Adult)   Ht 5' 8\"   Wt 168 lb 3.2 oz   BMI 25.57 kg/m²   Constitutional: The patient is well developed and well nourished.  Psychiatric: Patient is oriented to time place and person.   Skin: No rash observed.  Neck: Neck appears symmetric.  Heart:RRR, with 3/6 systolic ejection murmur  Respiratory: normal effort, Clear to auscultation  Breast: Diffuse " fibrocystic changes without dominant masses or skin changes.  Abdomen: Soft, non-tender. Non distended  Pelvic Exam:     External female genitalia: normal appearance    Urethra - no lesions, no erythema    Vagina: moist, pink, normal ruggae    Cervix: pink, smooth, no lesions, no CMT    Uterus - non-tender, normal size, shape, contour, mobile, mildly retroverted    Ovaries: non-tender, no appreciable masses  Extremeties: No edema, non tender    ASSESSMENT AND PLAN:  49 y.o.   who presents for annual gynecological exam.  Overall she feels well with the exception of the recent breast findings.  Her symptoms from trigeminal neuralgia have improved.  She was counseled regarding perimenopausal symptoms and she was understands that hormone replacement may depend on the pathology findings from her biopsy.  The left lower quadrant pain is likely musculoskeletal as it is along her inguinal ligament however based on her recent diagnosis as well as a history of endometriosis, I will order an ultrasound FSH and  to evaluate ovarian involvement.  A lipid profile and a CBC was also ordered.  Her CHEM panel was current.  She was counseled regarding follow-up for a colonoscopy as well.  Screening recommendations were discussed and she we will follow-up with her PCP to schedule a colonoscopy.  Follow-up as needed.    1. Well woman exam with routine gynecological exam  - THINPREP RFLX HPV ASCUS W/CTNG; Future  - Lipid Profile; Future  - CANCER ANTIGEN 125; Future  - CBC WITH DIFFERENTIAL; Future  - FSH; Future    2. Trigeminal neuralgia    3. Endometriosis    4. Pelvic pain  - CANCER ANTIGEN 125; Future  - CBC WITH DIFFERENTIAL; Future  - FSH; Future    5. Atypical ductal hyperplasia of left breast  - CANCER ANTIGEN 125; Future  - FSH; Future            Hayden Juarez M.D.

## 2024-11-21 DIAGNOSIS — Z01.419 WELL WOMAN EXAM WITH ROUTINE GYNECOLOGICAL EXAM: ICD-10-CM

## 2024-11-21 LAB
C TRACH DNA GENITAL QL NAA+PROBE: NEGATIVE
N GONORRHOEA DNA GENITAL QL NAA+PROBE: NEGATIVE
SPECIMEN SOURCE: NORMAL

## 2024-11-21 NOTE — PROGRESS NOTES
Subjective     Philomena Morfin is a 49 y.o. female who presents for evaluation of a new diagnosis of multifocal left breast atypical ductal hyperplasia.  She reports no specific breast concerns prior to her screening mammogram.    Routine self breast exams: No   Breast pain: No   Nipple discharge: No   Skin changes: No   Masses: No   Contour/nipple changes: No   Previous breast biopsy or surgery: No     Age at menarche: 9  Age at menopause: ?perimenopausal (had uterine ablation 13 years ago)  Age at first birth: 34    Hormone replacement therapy: No     Family history of cancer: Mother breast cancer age 73 (BRCA1/2 negative).  - The patient has had [employer] testing that was negative for deleterious mutation.    Lifetime (5-yr) breast cancer risk calculations  AURELIA/Yanique-Andreack v8: 34.1 % (5.7 %)    Imaging  - Screening mammogram ([facility]) 10/09/2024: Left breast amorphous branching calcifications. Right breast stable benign-appearing calcifications. BIRADS 0, density C.  - Diagnostic mammogram ([facility]) 2024: Extensive calcifications through the left breast with heterogeneous appearance; most suspicious groupings are lateral and central with possible ductal distribution. BIRADS 4b, density C.  All imaging personally reviewed (internal and external images).    Pathology  Left breast stereotactic CNBx 2024:  - Left breast lateral calcifications: Atypical ductal hyperplasia.     - Concordant. Open coil HydroMARK.  - Left breast central calcifications: Atypical ductal hyperplasia, flat epithelial atypia.     - Concordant. Butterfly HydroMARK.    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     ENDOMETRIAL ABLATION      HIP ARTHROSCOPY Right        Family History  Family History   Problem Relation Age of Onset    Breast Cancer Mother 73    Arthritis Mother         OA    Stroke Father         TIA     Arrythmia Father     Stroke Maternal Grandfather     Stroke Paternal Grandmother        Social History  Social History     Socioeconomic History    Marital status:      Spouse name: Not on file    Number of children: Not on file    Years of education: Not on file    Highest education level: Not on file   Occupational History    Not on file   Tobacco Use    Smoking status: Never    Smokeless tobacco: Never   Vaping Use    Vaping status: Never Used   Substance and Sexual Activity    Alcohol use: Not Currently     Alcohol/week: 1.8 oz     Types: 1 Glasses of wine, 2 Standard drinks or equivalent per week     Comment: 2 per week    Drug use: Yes     Types: Marijuana, Inhaled     Comment: Marijuana - occ.    Sexual activity: Yes     Partners: Male     Birth control/protection: Male Sterilization   Other Topics Concern    Not on file   Social History Narrative    Not on file     Social Drivers of Health     Financial Resource Strain: Not on file   Food Insecurity: Not on file   Transportation Needs: Not on file   Physical Activity: Not on file   Stress: Not on file   Social Connections: Not on file   Intimate Partner Violence: Not on file   Housing Stability: Not on file       Review of Systems  Review of Systems   Constitutional:  Positive for diaphoresis.   HENT:   Positive for tinnitus.    Musculoskeletal:  Positive for back pain.   Skin:  Positive for itching.   All other systems  "reviewed and are negative.       Objective   /86 (BP Location: Left arm, Patient Position: Sitting, BP Cuff Size: Large adult)   Pulse 65   Temp 36.7 °C (98 °F) (Temporal)   Ht 1.727 m (5' 8\")   Wt 76.2 kg (168 lb)   SpO2 93%   BMI 25.54 kg/m²    Physical Exam  Vitals and nursing note reviewed.   Constitutional:       General: She is not in acute distress.     Appearance: Normal appearance.   HENT:      Head: Normocephalic and atraumatic.      Right Ear: External ear normal.      Left Ear: External ear normal.      Nose: Nose normal.      Mouth/Throat:      Pharynx: Oropharynx is clear.   Eyes:      General: No scleral icterus.     Conjunctiva/sclera: Conjunctivae normal.   Cardiovascular:      Rate and Rhythm: Normal rate and regular rhythm.      Heart sounds: Murmur (systolic murmur III/VI consistent with repaired pulmonary atresia) heard.      No friction rub. No gallop.   Pulmonary:      Effort: Pulmonary effort is normal. No respiratory distress.      Breath sounds: Normal breath sounds. No wheezing, rhonchi or rales.   Chest:   Breasts:     Jose Score is 5.      Breasts are symmetrical.      Right: Normal. No swelling, bleeding, inverted nipple, mass, nipple discharge or skin change.      Left: Normal. No swelling, bleeding, inverted nipple, mass, nipple discharge or skin change.          Comments: Bilateral breasts examined in the upright and supine positions.  Healed vertical median sternotomy incision and mediastinal tube exit sites in the epigastrium.  No suspicious skin changes (erythema, peau d'orange).  No unexpected contour abnormalities.  Bilateral breast tissue heterogeneously dense with no dominant masses or nodules; there is increased density in the left upper outer quadrant that is very ill-defined and may relate to post-biopsy change.  Bilateral nipples everted without expressible discharge.  No palpable cervical, supraclavicular, or axillary adenopathy bilaterally.  Bedside " ultrasound performed with the GE 12mHz linear probe confirming biopsy changes and HydroMARK markers visible in the left breast 12:00 4cmFN position (central) and 02:00 5cmFN position (lateral).  Abdominal:      General: Abdomen is flat. There is no distension.      Palpations: Abdomen is soft. There is no mass.   Musculoskeletal:         General: No swelling or deformity. Normal range of motion.      Cervical back: Neck supple.   Lymphadenopathy:      Cervical: No cervical adenopathy.      Upper Body:      Right upper body: No supraclavicular or axillary adenopathy.      Left upper body: No supraclavicular or axillary adenopathy.   Skin:     General: Skin is warm and dry.      Capillary Refill: Capillary refill takes less than 2 seconds.   Neurological:      General: No focal deficit present.      Mental Status: She is alert and oriented to person, place, and time.   Psychiatric:         Mood and Affect: Mood normal.         Behavior: Behavior normal.         Thought Content: Thought content normal.         Judgment: Judgment normal.         [facility] [place] ECOG Performance Status categories: 0= Fully active, able to carry on all pre-disease performance without restriction.    Assessment & Plan   Delightful 49F with a new diagnosis of left breast multifocal atypical ductal hyperplasia.  We reviewed her mammogram images together in detail going back to 2016 so she could see the change.  We discussed that ADH is a nonobligate precursor to breast cancer and that the upstage rate is as high as 20-30% on excision, so the standard of care is to perform an excisional biopsy of the area to confirm the diagnosis and also to remove any residual atypical cells from the area.  We discussed that ADH also confers an increased risk of developing breast cancer in the future, and that we recommend high-risk surveillance and the consideration of chemoprevention after excision confirms the diagnosis.  Her lifetime and 5-year risk of breast cancer  will be recalculated once the final surgical diagnosis is completed to guide further treatment and surveillance.  Prior to surgery, however, given the extent of calcifications throughout the left breast, an MRI is warranted to better determine the degree of suspicion for missed malignancy.  This will likely not change the initial surgical plan, but if there is high suspicion throughout the entire area of calcifications it would potentially change the surgical plan based on the pathology from the excisional biopsy.      The patient has the following potential barriers to care:  No identified barriers today.    I will see her back a few days after the breast MRI so we can review the images together and formulate a surgical plan with shared decision-making.  All questions answered in detail.    A total of 70 minutes were spent on and with this patient today, including review of records, independent review of imaging, history and physical exam, counseling, documentation of exam, and coordination of care.  The patient was given a copy of her percutaneous biopsy pathology report.  I will see her back as soon as the MRI has been completed.    Problem   Atypical Ductal Hyperplasia of Left Breast    Left breast UOQ extensive calcifications  - Stereotactic CNBx x2 11/13/2024: Atypical ductal hyperplasia  - MRI pending  - Planned excisional biopsy (Jose)

## 2024-12-01 LAB — THINPREP PAP, CYTOLOGY NL11781: NORMAL

## 2024-12-05 ENCOUNTER — APPOINTMENT (OUTPATIENT)
Dept: RADIOLOGY | Facility: MEDICAL CENTER | Age: 49
End: 2024-12-05
Attending: SURGERY
Payer: COMMERCIAL

## 2024-12-05 DIAGNOSIS — N60.92 ATYPICAL DUCTAL HYPERPLASIA OF LEFT BREAST: ICD-10-CM

## 2024-12-05 PROCEDURE — 700117 HCHG RX CONTRAST REV CODE 255: Mod: JZ | Performed by: SURGERY

## 2024-12-05 PROCEDURE — A9579 GAD-BASE MR CONTRAST NOS,1ML: HCPCS | Mod: JZ | Performed by: SURGERY

## 2024-12-05 PROCEDURE — 77049 MRI BREAST C-+ W/CAD BI: CPT

## 2024-12-05 RX ADMIN — GADOTERIDOL 15 ML: 279.3 INJECTION, SOLUTION INTRAVENOUS at 10:59

## 2024-12-10 ENCOUNTER — OFFICE VISIT (OUTPATIENT)
Dept: SURGERY | Facility: MEDICAL CENTER | Age: 49
End: 2024-12-10
Payer: COMMERCIAL

## 2024-12-10 VITALS
OXYGEN SATURATION: 97 % | DIASTOLIC BLOOD PRESSURE: 83 MMHG | HEART RATE: 65 BPM | WEIGHT: 166 LBS | HEIGHT: 68 IN | SYSTOLIC BLOOD PRESSURE: 130 MMHG | TEMPERATURE: 96.9 F | BODY MASS INDEX: 25.16 KG/M2

## 2024-12-10 DIAGNOSIS — N60.92 ATYPICAL DUCTAL HYPERPLASIA OF LEFT BREAST: ICD-10-CM

## 2024-12-10 PROCEDURE — 3079F DIAST BP 80-89 MM HG: CPT | Performed by: SURGERY

## 2024-12-10 PROCEDURE — 3075F SYST BP GE 130 - 139MM HG: CPT | Performed by: SURGERY

## 2024-12-10 PROCEDURE — 99213 OFFICE O/P EST LOW 20 MIN: CPT | Performed by: SURGERY

## 2024-12-10 NOTE — PROGRESS NOTES
"    SUBJECTIVE:   Jenelle Morfin is a delightful 49 y.o. female who presents in follow up for left breast atypical ductal hyperplasia to review her MRI findings.  There are no changes in her functional status and she is overall doing well.      Interval Imaging:  - Bilateral breast MRI 12/05/2024: Left breast biopsy markers, without enhancing mass or suspicious findings. BIRADS 2.    OBJECTIVE:  /83 (BP Location: Right arm, Patient Position: Sitting, BP Cuff Size: Large adult long)   Pulse 65   Temp 36.1 °C (96.9 °F) (Temporal)   Ht 1.727 m (5' 8\")   Wt 75.3 kg (166 lb)   SpO2 97%   BMI 25.24 kg/m²    General: Alert, oriented, pleasant, no acute distress.  HEENT: Extraocular movements intact, no scleral icterus or conjunctival injection.  Chest: Respirations unlabored on room air.  Abdomen: Soft, nondistended.  Extremities: Warm, well-perfused.  Breasts: Limited left breast exam performed with the patient upright and supine.  Very dense tissue, mildly tender to palpation in the left upper outer quadrant, without discrete mass,    [facility] [place] ECOG Performance Status categories: 0= Fully active, able to carry on all pre-disease performance without restriction.  FUNCTIONAL ASSESSMENT  Patient responses to questions:  - Have you ever had shoulder surgery or been treated for a shoulder injury that resulted in a loss of range of motion?  No   - Are you unable to reach into an upper cabinet and retrieve a cup or plate?  No   - Do you have any limitations in daily activities because of your shoulder?  No   Overhead raise test for shoulder flexion:  Functional minimum:  125-149 degrees    ASSESSMENT AND PLAN:  Delightful 49 y.o. female, here for follow-up of left breast atypical ductal hyperplasia.  Fortunately, her MRI returned without any other specific areas of concern.  We reviewed the images together today and discussed next steps.  We will proceed with surgery scheduling for left breast excisional " biopsy x2.  All questions answered in detail.      Problem   Atypical Ductal Hyperplasia of Left Breast    Left breast UOQ extensive calcifications  - Stereotactic CNBx x2 11/13/2024: Atypical ductal hyperplasia  - Planned excisional biopsy (Jose)

## 2024-12-16 ENCOUNTER — DOCUMENTATION (OUTPATIENT)
Dept: SURGERY | Facility: MEDICAL CENTER | Age: 49
End: 2024-12-16
Payer: COMMERCIAL

## 2024-12-30 ENCOUNTER — APPOINTMENT (OUTPATIENT)
Dept: ADMISSIONS | Facility: MEDICAL CENTER | Age: 49
End: 2024-12-30
Attending: SURGERY
Payer: COMMERCIAL

## 2025-01-02 ENCOUNTER — DOCUMENTATION (OUTPATIENT)
Dept: SURGERY | Facility: MEDICAL CENTER | Age: 50
End: 2025-01-02
Payer: COMMERCIAL

## 2025-01-03 ENCOUNTER — PRE-ADMISSION TESTING (OUTPATIENT)
Dept: ADMISSIONS | Facility: MEDICAL CENTER | Age: 50
End: 2025-01-03
Attending: SURGERY
Payer: COMMERCIAL

## 2025-01-03 ENCOUNTER — OFFICE VISIT (OUTPATIENT)
Dept: SURGERY | Facility: MEDICAL CENTER | Age: 50
End: 2025-01-03
Payer: COMMERCIAL

## 2025-01-03 VITALS
WEIGHT: 164 LBS | HEIGHT: 68 IN | SYSTOLIC BLOOD PRESSURE: 120 MMHG | OXYGEN SATURATION: 98 % | BODY MASS INDEX: 24.86 KG/M2 | DIASTOLIC BLOOD PRESSURE: 84 MMHG | TEMPERATURE: 96.4 F | HEART RATE: 74 BPM

## 2025-01-03 DIAGNOSIS — Z01.812 PRE-OPERATIVE LABORATORY EXAMINATION: ICD-10-CM

## 2025-01-03 DIAGNOSIS — N60.92 ATYPICAL DUCTAL HYPERPLASIA OF LEFT BREAST: ICD-10-CM

## 2025-01-03 LAB
ERYTHROCYTE [DISTWIDTH] IN BLOOD BY AUTOMATED COUNT: 42 FL (ref 35.9–50)
HCT VFR BLD AUTO: 46.7 % (ref 37–47)
HGB BLD-MCNC: 15.6 G/DL (ref 12–16)
MCH RBC QN AUTO: 30.8 PG (ref 27–33)
MCHC RBC AUTO-ENTMCNC: 33.4 G/DL (ref 32.2–35.5)
MCV RBC AUTO: 92.3 FL (ref 81.4–97.8)
PLATELET # BLD AUTO: 307 K/UL (ref 164–446)
PMV BLD AUTO: 9.9 FL (ref 9–12.9)
RBC # BLD AUTO: 5.06 M/UL (ref 4.2–5.4)
WBC # BLD AUTO: 7.9 K/UL (ref 4.8–10.8)

## 2025-01-03 PROCEDURE — 3074F SYST BP LT 130 MM HG: CPT

## 2025-01-03 PROCEDURE — 99203 OFFICE O/P NEW LOW 30 MIN: CPT

## 2025-01-03 PROCEDURE — 3079F DIAST BP 80-89 MM HG: CPT

## 2025-01-03 PROCEDURE — 85027 COMPLETE CBC AUTOMATED: CPT

## 2025-01-03 PROCEDURE — 36415 COLL VENOUS BLD VENIPUNCTURE: CPT

## 2025-01-03 ASSESSMENT — ENCOUNTER SYMPTOMS
CONSTITUTIONAL NEGATIVE: 1
EYES NEGATIVE: 1
MUSCULOSKELETAL NEGATIVE: 1
RESPIRATORY NEGATIVE: 1
NEUROLOGICAL NEGATIVE: 1
PSYCHIATRIC NEGATIVE: 1
GASTROINTESTINAL NEGATIVE: 1
CARDIOVASCULAR NEGATIVE: 1

## 2025-01-03 NOTE — PROGRESS NOTES
"         SUBJECTIVE:   Jenelle Morfin is a delightful 49 y.o. female who presents for pre-op visit and H&P update for known left breast atypical ductal hyperplasia. She will be undergoing a left excisional biopsy x 2 on 01/08/2025. Currently, she reports no new breast symptoms or changes to her health since last visit. There are no changes in her functional status and she is overall doing well.      Review of Systems   Constitutional: Negative.    HENT:  Positive for tinnitus.    Eyes: Negative.    Respiratory: Negative.     Cardiovascular: Negative.    Gastrointestinal: Negative.    Genitourinary: Negative.    Musculoskeletal: Negative.    Skin: Negative.    Neurological: Negative.    Endo/Heme/Allergies: Negative.    Psychiatric/Behavioral: Negative.       OBJECTIVE:  /84 (BP Location: Left arm, Patient Position: Sitting, BP Cuff Size: Large adult long)   Pulse 74   Temp (!) 35.8 °C (96.4 °F) (Temporal)   Ht 1.727 m (5' 8\")   Wt 74.4 kg (164 lb)   SpO2 98%   BMI 24.94 kg/m²    General: Alert, oriented, pleasant, no acute distress.  HEENT: Extraocular movements intact, no scleral icterus or conjunctival injection.  Lung: Respirations unlabored on room air. Lung sounds clear in all fields.  Cardio: Normal rate and rhythm. Heart sounds normal.  Abdomen: Soft, nondistended.  Extremities: Warm, well-perfused.  Breasts: Bilateral breasts examined in the upright and supine positions. No suspicious skin findings on either side (erythema, peau d'orange). Bilateral breasts are heterogeneously dense without dominant masses or nodules; Left breast UOQ increased ill-defined density/firmness. Bilateral nipples everted without expressible discharge.  No unexpected contour abnormalities. No palpable cervical, supraclavicular, or axillary adenopathy.     FUNCTIONAL ASSESSMENT  Patient responses to questions:    Have you ever had shoulder surgery or been treated for a shoulder injury that resulted in a loss " of range of motion?  No     Are you unable to reach into an upper cabinet and retrieve a cup or plate?  No     Do you have any limitations in daily activities because of your shoulder?  No     Overhead raise test for shoulder flexion:  Functional minimum:  125-149 degrees     [facility] ECOG Performance Status categories: 0= Fully active, able to carry on all pre-disease performance without restriction.    ASSESSMENT AND PLAN:  Delightful 49 y.o. female, here for pre-op visit.   Currently she is doing well, without any changes to her medical record. We discussed the plan for Left excisional biopsy x2. She agrees and elects to proceed with surgery as scheduled. We discussed in depth surgical and postoperative expectations, including activity restrictions, dressing management, etc. Post-operative instructions provided at the consultation with an expectation of additional instructions day of surgery. She is aware to be NPO after midnight the night prior to surgery, aside from 16 oz of water 3 hours prior, and stop necessary medications according to preop recommendations. We discussed that she likely will only need Tylenol/Ibuprofen for this type of procedure, which she can alterate every 3 hours for pain. All questions have been answered in detail.     Post-op visit: 01/14/2025 at 9:00am      Problem   Atypical Ductal Hyperplasia of Left Breast    Left breast UOQ extensive calcifications  - Stereotactic CNBx x2 11/13/2024: Atypical ductal hyperplasia  - Planned left excisional biopsies (x2) 01/08/2025 (Jose)          Cancer Staging   No matching staging information was found for the patient.       External imaging and reports were independently reviewed.  I spent 30 minutes today preparing to see the patient (including chart preparation and review), obtaining and reviewing additional medical history, performing a physical exam and evaluation, documenting clinical information in the electronic health record, independently  interpreting results, communicating results to the patient, and coordinating care.     Shae Lagos PA-C

## 2025-01-03 NOTE — PREADMIT AVS NOTE
Current Medications   Medication Instructions    baclofen (LIORESAL) 10 MG Tab Continue taking medication as prescribed         carbamazepine (CARBATROL) 300 MG CR capsule Continue taking medication as prescribed, including morning of procedure     Multiple Vitamins-Minerals (MULTIVITAMIN PO) Stop 7 days before surgery

## 2025-01-06 ENCOUNTER — TELEPHONE (OUTPATIENT)
Dept: SURGERY | Facility: MEDICAL CENTER | Age: 50
End: 2025-01-06
Payer: COMMERCIAL

## 2025-01-08 ENCOUNTER — HOSPITAL ENCOUNTER (OUTPATIENT)
Facility: MEDICAL CENTER | Age: 50
End: 2025-01-08
Attending: SURGERY | Admitting: SURGERY
Payer: COMMERCIAL

## 2025-01-08 ENCOUNTER — ANESTHESIA (OUTPATIENT)
Dept: SURGERY | Facility: MEDICAL CENTER | Age: 50
End: 2025-01-08
Payer: COMMERCIAL

## 2025-01-08 ENCOUNTER — APPOINTMENT (OUTPATIENT)
Dept: RADIOLOGY | Facility: MEDICAL CENTER | Age: 50
End: 2025-01-08
Attending: SURGERY
Payer: COMMERCIAL

## 2025-01-08 ENCOUNTER — ANESTHESIA EVENT (OUTPATIENT)
Dept: SURGERY | Facility: MEDICAL CENTER | Age: 50
End: 2025-01-08
Payer: COMMERCIAL

## 2025-01-08 VITALS
BODY MASS INDEX: 24.26 KG/M2 | TEMPERATURE: 98 F | HEART RATE: 74 BPM | DIASTOLIC BLOOD PRESSURE: 81 MMHG | SYSTOLIC BLOOD PRESSURE: 127 MMHG | RESPIRATION RATE: 16 BRPM | HEIGHT: 69 IN | OXYGEN SATURATION: 91 % | WEIGHT: 163.8 LBS

## 2025-01-08 DIAGNOSIS — N60.92 BENIGN MAMMARY DYSPLASIA OF LEFT BREAST: ICD-10-CM

## 2025-01-08 LAB
HCG UR QL: NEGATIVE
PATHOLOGY CONSULT NOTE: NORMAL

## 2025-01-08 PROCEDURE — 700105 HCHG RX REV CODE 258: Performed by: ANESTHESIOLOGY

## 2025-01-08 PROCEDURE — 160025 RECOVERY II MINUTES (STATS): Performed by: SURGERY

## 2025-01-08 PROCEDURE — 160028 HCHG SURGERY MINUTES - 1ST 30 MINS LEVEL 3: Performed by: SURGERY

## 2025-01-08 PROCEDURE — 19286 PERQ DEV BREAST ADD US IMAG: CPT | Mod: LT | Performed by: SURGERY

## 2025-01-08 PROCEDURE — 700102 HCHG RX REV CODE 250 W/ 637 OVERRIDE(OP): Performed by: ANESTHESIOLOGY

## 2025-01-08 PROCEDURE — A4648 IMPLANTABLE TISSUE MARKER: HCPCS | Performed by: SURGERY

## 2025-01-08 PROCEDURE — A9270 NON-COVERED ITEM OR SERVICE: HCPCS | Performed by: ANESTHESIOLOGY

## 2025-01-08 PROCEDURE — 76098 X-RAY EXAM SURGICAL SPECIMEN: CPT | Mod: 26 | Performed by: SURGERY

## 2025-01-08 PROCEDURE — 19126 EXCISION ADDL BREAST LESION: CPT | Mod: AS,LT

## 2025-01-08 PROCEDURE — 76098 X-RAY EXAM SURGICAL SPECIMEN: CPT | Mod: LT

## 2025-01-08 PROCEDURE — 81025 URINE PREGNANCY TEST: CPT

## 2025-01-08 PROCEDURE — 19286 PERQ DEV BREAST ADD US IMAG: CPT | Mod: AS,LT

## 2025-01-08 PROCEDURE — 19125 EXCISION BREAST LESION: CPT | Mod: AS,LT

## 2025-01-08 PROCEDURE — 19126 EXCISION ADDL BREAST LESION: CPT | Mod: LT | Performed by: SURGERY

## 2025-01-08 PROCEDURE — 160035 HCHG PACU - 1ST 60 MINS PHASE I: Performed by: SURGERY

## 2025-01-08 PROCEDURE — 160002 HCHG RECOVERY MINUTES (STAT): Performed by: SURGERY

## 2025-01-08 PROCEDURE — 88307 TISSUE EXAM BY PATHOLOGIST: CPT

## 2025-01-08 PROCEDURE — 160048 HCHG OR STATISTICAL LEVEL 1-5: Performed by: SURGERY

## 2025-01-08 PROCEDURE — 160009 HCHG ANES TIME/MIN: Performed by: SURGERY

## 2025-01-08 PROCEDURE — 700111 HCHG RX REV CODE 636 W/ 250 OVERRIDE (IP): Mod: JZ | Performed by: ANESTHESIOLOGY

## 2025-01-08 PROCEDURE — 19125 EXCISION BREAST LESION: CPT | Mod: LT | Performed by: SURGERY

## 2025-01-08 PROCEDURE — 700111 HCHG RX REV CODE 636 W/ 250 OVERRIDE (IP): Mod: JZ | Performed by: SURGERY

## 2025-01-08 PROCEDURE — 160039 HCHG SURGERY MINUTES - EA ADDL 1 MIN LEVEL 3: Performed by: SURGERY

## 2025-01-08 PROCEDURE — 19285 PERQ DEV BREAST 1ST US IMAG: CPT | Mod: LT | Performed by: SURGERY

## 2025-01-08 PROCEDURE — 88307 TISSUE EXAM BY PATHOLOGIST: CPT | Mod: 26 | Performed by: PATHOLOGY

## 2025-01-08 PROCEDURE — 700101 HCHG RX REV CODE 250: Performed by: ANESTHESIOLOGY

## 2025-01-08 PROCEDURE — 19285 PERQ DEV BREAST 1ST US IMAG: CPT | Mod: AS,LT

## 2025-01-08 PROCEDURE — 76098 X-RAY EXAM SURGICAL SPECIMEN: CPT | Mod: AS,26

## 2025-01-08 PROCEDURE — 160046 HCHG PACU - 1ST 60 MINS PHASE II: Performed by: SURGERY

## 2025-01-08 RX ORDER — BUPIVACAINE HYDROCHLORIDE 2.5 MG/ML
INJECTION, SOLUTION EPIDURAL; INFILTRATION; INTRACAUDAL; PERINEURAL
Status: DISCONTINUED
Start: 2025-01-08 | End: 2025-01-08 | Stop reason: HOSPADM

## 2025-01-08 RX ORDER — LIDOCAINE HYDROCHLORIDE 10 MG/ML
INJECTION, SOLUTION EPIDURAL; INFILTRATION; INTRACAUDAL; PERINEURAL
Status: DISCONTINUED
Start: 2025-01-08 | End: 2025-01-08 | Stop reason: HOSPADM

## 2025-01-08 RX ORDER — DIPHENHYDRAMINE HYDROCHLORIDE 50 MG/ML
12.5 INJECTION, SOLUTION INTRAMUSCULAR; INTRAVENOUS
Status: DISCONTINUED | OUTPATIENT
Start: 2025-01-08 | End: 2025-01-08 | Stop reason: HOSPADM

## 2025-01-08 RX ORDER — ONDANSETRON 2 MG/ML
4 INJECTION INTRAMUSCULAR; INTRAVENOUS
Status: DISCONTINUED | OUTPATIENT
Start: 2025-01-08 | End: 2025-01-08 | Stop reason: HOSPADM

## 2025-01-08 RX ORDER — DEXAMETHASONE SODIUM PHOSPHATE 4 MG/ML
INJECTION, SOLUTION INTRA-ARTICULAR; INTRALESIONAL; INTRAMUSCULAR; INTRAVENOUS; SOFT TISSUE PRN
Status: DISCONTINUED | OUTPATIENT
Start: 2025-01-08 | End: 2025-01-08 | Stop reason: SURG

## 2025-01-08 RX ORDER — ALPRAZOLAM 0.25 MG
.25-.5 TABLET ORAL
Status: DISCONTINUED | OUTPATIENT
Start: 2025-01-08 | End: 2025-01-08 | Stop reason: HOSPADM

## 2025-01-08 RX ORDER — HALOPERIDOL 5 MG/ML
1 INJECTION INTRAMUSCULAR
Status: DISCONTINUED | OUTPATIENT
Start: 2025-01-08 | End: 2025-01-08 | Stop reason: HOSPADM

## 2025-01-08 RX ORDER — MIDAZOLAM HYDROCHLORIDE 1 MG/ML
INJECTION INTRAMUSCULAR; INTRAVENOUS PRN
Status: DISCONTINUED | OUTPATIENT
Start: 2025-01-08 | End: 2025-01-08 | Stop reason: SURG

## 2025-01-08 RX ORDER — DIPHENHYDRAMINE HCL 25 MG
25 TABLET ORAL EVERY 6 HOURS PRN
Status: DISCONTINUED | OUTPATIENT
Start: 2025-01-08 | End: 2025-01-08 | Stop reason: HOSPADM

## 2025-01-08 RX ORDER — LIDOCAINE HYDROCHLORIDE 10 MG/ML
INJECTION, SOLUTION EPIDURAL; INFILTRATION; INTRACAUDAL; PERINEURAL
Status: DISCONTINUED | OUTPATIENT
Start: 2025-01-08 | End: 2025-01-08 | Stop reason: HOSPADM

## 2025-01-08 RX ORDER — CEFAZOLIN SODIUM 1 G/3ML
INJECTION, POWDER, FOR SOLUTION INTRAMUSCULAR; INTRAVENOUS PRN
Status: DISCONTINUED | OUTPATIENT
Start: 2025-01-08 | End: 2025-01-08 | Stop reason: SURG

## 2025-01-08 RX ORDER — OXYCODONE HCL 5 MG/5 ML
10 SOLUTION, ORAL ORAL
Status: COMPLETED | OUTPATIENT
Start: 2025-01-08 | End: 2025-01-08

## 2025-01-08 RX ORDER — SODIUM CHLORIDE, SODIUM LACTATE, POTASSIUM CHLORIDE, CALCIUM CHLORIDE 600; 310; 30; 20 MG/100ML; MG/100ML; MG/100ML; MG/100ML
INJECTION, SOLUTION INTRAVENOUS
Status: DISCONTINUED | OUTPATIENT
Start: 2025-01-08 | End: 2025-01-08 | Stop reason: SURG

## 2025-01-08 RX ORDER — DIPHENHYDRAMINE HYDROCHLORIDE 50 MG/ML
25 INJECTION, SOLUTION INTRAMUSCULAR; INTRAVENOUS EVERY 6 HOURS PRN
Status: DISCONTINUED | OUTPATIENT
Start: 2025-01-08 | End: 2025-01-08 | Stop reason: HOSPADM

## 2025-01-08 RX ORDER — BUPIVACAINE HYDROCHLORIDE 2.5 MG/ML
INJECTION, SOLUTION EPIDURAL; INFILTRATION; INTRACAUDAL; PERINEURAL
Status: DISCONTINUED | OUTPATIENT
Start: 2025-01-08 | End: 2025-01-08 | Stop reason: HOSPADM

## 2025-01-08 RX ORDER — ONDANSETRON 2 MG/ML
INJECTION INTRAMUSCULAR; INTRAVENOUS PRN
Status: DISCONTINUED | OUTPATIENT
Start: 2025-01-08 | End: 2025-01-08 | Stop reason: SURG

## 2025-01-08 RX ORDER — OXYCODONE HCL 5 MG/5 ML
5 SOLUTION, ORAL ORAL
Status: COMPLETED | OUTPATIENT
Start: 2025-01-08 | End: 2025-01-08

## 2025-01-08 RX ORDER — LIDOCAINE HYDROCHLORIDE 20 MG/ML
INJECTION, SOLUTION EPIDURAL; INFILTRATION; INTRACAUDAL; PERINEURAL PRN
Status: DISCONTINUED | OUTPATIENT
Start: 2025-01-08 | End: 2025-01-08 | Stop reason: SURG

## 2025-01-08 RX ADMIN — DEXAMETHASONE SODIUM PHOSPHATE 4 MG: 4 INJECTION INTRA-ARTICULAR; INTRALESIONAL; INTRAMUSCULAR; INTRAVENOUS; SOFT TISSUE at 12:14

## 2025-01-08 RX ADMIN — CEFAZOLIN 2 G: 1 INJECTION, POWDER, FOR SOLUTION INTRAMUSCULAR; INTRAVENOUS at 12:07

## 2025-01-08 RX ADMIN — PROPOFOL 150 MG: 10 INJECTION, EMULSION INTRAVENOUS at 12:14

## 2025-01-08 RX ADMIN — ONDANSETRON 4 MG: 2 INJECTION INTRAMUSCULAR; INTRAVENOUS at 12:14

## 2025-01-08 RX ADMIN — MIDAZOLAM HYDROCHLORIDE 2 MG: 1 INJECTION, SOLUTION INTRAMUSCULAR; INTRAVENOUS at 12:14

## 2025-01-08 RX ADMIN — OXYCODONE HYDROCHLORIDE 5 MG: 5 SOLUTION ORAL at 13:44

## 2025-01-08 RX ADMIN — SODIUM CHLORIDE, POTASSIUM CHLORIDE, SODIUM LACTATE AND CALCIUM CHLORIDE: 600; 310; 30; 20 INJECTION, SOLUTION INTRAVENOUS at 12:07

## 2025-01-08 RX ADMIN — FENTANYL CITRATE 100 MCG: 50 INJECTION, SOLUTION INTRAMUSCULAR; INTRAVENOUS at 12:14

## 2025-01-08 RX ADMIN — LIDOCAINE HYDROCHLORIDE 100 MG: 20 INJECTION, SOLUTION EPIDURAL; INFILTRATION; INTRACAUDAL; PERINEURAL at 12:14

## 2025-01-08 RX ADMIN — FENTANYL CITRATE 25 MCG: 50 INJECTION, SOLUTION INTRAMUSCULAR; INTRAVENOUS at 13:45

## 2025-01-08 ASSESSMENT — PAIN DESCRIPTION - PAIN TYPE
TYPE: SURGICAL PAIN

## 2025-01-08 ASSESSMENT — FIBROSIS 4 INDEX: FIB4 SCORE: 0.7

## 2025-01-08 ASSESSMENT — PAIN SCALES - GENERAL: PAIN_LEVEL: 2

## 2025-01-08 NOTE — ANESTHESIA TIME REPORT
Anesthesia Start and Stop Event Times       Date Time Event    1/8/2025 1147 Ready for Procedure     1207 Anesthesia Start     1323 Anesthesia Stop          Responsible Staff  01/08/25      Name Role Begin End    Valerio Nettles M.D. Anesth 1207 1323          Overtime Reason:  no overtime (within assigned shift)    Comments:

## 2025-01-08 NOTE — ANESTHESIA PROCEDURE NOTES
Airway    Date/Time: 1/8/2025 12:14 PM    Performed by: Valerio Nettles M.D.  Authorized by: Valerio Nettles M.D.    Location:  OR  Urgency:  Elective  Indications for Airway Management:  Anesthesia      Spontaneous Ventilation: absent    Sedation Level:  Deep  Preoxygenated: Yes    Final Airway Type:  Supraglottic airway  Final Supraglottic Airway:  Standard LMA    SGA Size:  3  Number of Attempts at Approach:  1

## 2025-01-08 NOTE — OR NURSING
1320 Patient arrived to PACU from OR. Report received from RN and anesthesia. Patient attached to monitoring. VSS. Patient oxygenating well on 6 L via mask. Site assessed on L chest, CDI. No signs of bleeding.     1344 Patient complaining of 4/10 pain in chest. Iv and PO pain medication given per MAR.    1352 RN updated patients family member via phone.     1415 Patient meets phase two criteria. Tolerating PO liquids without complication.     1450 RN went over discharge instructions with patient and patient's . All questions answered.     1500 Intact IV removed by RN.    1505 Patient meets discharge criteria. VSS. Pt discharged to a responsible adult via wheelchair by RN. Pt in possession of all personal belongings.

## 2025-01-08 NOTE — DISCHARGE INSTRUCTIONS
What to Expect Post Anesthesia    Rest and take it easy for the first 24 hours.  A responsible adult is recommended to remain with you during that time.  It is normal to feel sleepy.  We encourage you to not do anything that requires balance, judgment or coordination.    FOR 24 HOURS DO NOT:  Drive, operate machinery or run household appliances.  Drink beer or alcoholic beverages.  Make important decisions or sign legal documents.    To avoid nausea, slowly advance diet as tolerated, avoiding spicy or greasy foods for the first day.  Add more substantial food to your diet according to your provider's instructions.  Babies can be fed formula or breast milk as soon as they are hungry.  INCREASE FLUIDS AND FIBER TO AVOID CONSTIPATION.    MILD FLU-LIKE SYMPTOMS ARE NORMAL.  YOU MAY EXPERIENCE GENERALIZED MUSCLE ACHES, THROAT IRRITATION, HEADACHE AND/OR SOME NAUSEA.    If any questions arise, call your provider.  If your provider is not available, please feel free to call the Surgical Center at [phone].    MEDICATIONS: Resume taking daily medication.  Take prescribed pain medication with food.  If no medication is prescribed, you may take non-aspirin pain medication if needed.  PAIN MEDICATION CAN BE VERY CONSTIPATING.  Take a stool softener or laxative such as senokot, pericolace, or milk of magnesia if needed.    Last pain medication given at 1:44 pm. You may take tylenol at any time.

## 2025-01-08 NOTE — OP REPORT
Patient Name: Jenelle Morfin   Medical Record Number: [id number]   Date of Service: 1/8/2025    Surgeon: Kathie Gomez MD [facility]  Assistant: Shae Lagos PA-C    Operation:  Left breast ultrasound-guided wire localization x2 areas  Left breast wire-localized excisional biopsy x2  Interpretation of intraoperative specimen radiograph    Preoperative Diagnosis: Left breast multifocal atypical ductal hyperplasia  Postoperative Diagnosis: Same    Anesthesia: General anesthesia via laryngeal mask airway.  Multimodal analgesia was initiated preoperatively by the anesthesia team.    Estimated Blood Loss: 15 mL      Complications: None    Operative Findings: Intraoperative specimen radiograph demonstrated excision and capture of the calcifications, biopsy markers, and localization wires in both specimens.    Indications: Jenelle Morfin is a 49 y.o. female who was diagnosed with multifocal left breast atypical ductal hyperplasia on 11/13/2024 via stereotactic core needle biopsy.  A thorough discussion of the indications, alternatives, risks, and benefits to excisional biopsy was held with the patient.  All questions were answered in detail.  Informed consent was signed and placed in the chart.    Description of Operation:  The patient was brought into the operating room and placed supine on the operating table.  Preoperative antibiotics were administered and sequential compression devices were placed for venous thromboembolic prophylaxis.  Smooth General anesthesia via laryngeal mask airway was induced.  Once the patient was resting comfortably the 13 MHz linear ultrasound probe was used to identify the biopsy markers in the left breast at the 12:00 position, 4 cm from the nipple and the 02:00 position, 5 cm from the nipple.  These were clearly marked on the skin.  A preoperative timeout was performed confirming the patient's identity and the nature and location of the operation to be  performed.    The left breast was prepped with alcohol.  Under direct ultrasound visualization a 5 cm Kopans wire was introduced using a modified Seldinger technique from a lateral approach to lie immediately adjacent to the biopsy marker in the 12:00 position.  The wire was then trimmed.  This procedure was repeated with a second 5 cm Kopans wire for the 02:00 lesion.    The breast was then prepped and draped in the usual sterile fashion using chlorhexidine.  The planned incision was marked on the skin in a radial fashion.  The planned incision and underlying breast tissue were anesthetized with a 1:1 mixture of 1% lidocaine plain and 0.25% bupivicaine plain as a field block.  The incision was then made sharply and continued into the subcutaneous tissue with electrocautery.  The Kopans wires were encountered at the lateral aspect of the incision and were carefully secured and brought into the incision.  The 12:00 wire was then traced down and the targeted breast tissue was dissected free from the surrounding breast tissue using electrocautery.  The specimen was then oriented using CorrectClips and was submitted for specimen radiograph.  This demonstrated capture of the calcifications, biopsy marker (butterfly), and localization wire in their entirety.  The specimen was then submitted to pathology.  The 02:00 wire was then traced down and the targeted breast tissue was dissected free from the surrounding breast tissue using electrocautery.  The specimen was then oriented using Correct Clips and was submitted for specimen radiograph, which demonstrated capture of the calcifications, biopsy marker (open coil), and localization wire in their entirety.The incision was inspected for hemostasis, which was achieved with electrocautery.  The wound was then irrigated thoroughly with warm sterile saline; the effluent was clear.  The breast tissue was then gently reapproximated using interrupted 2-0 Vicryl sutures.  The skin  was closed with interrupted 3-0 Monocryl deep dermal sutures and a 4-0 Monocryl running subcuticular suture.  A sterile dressing was applied.    The patient was then awakened from General anesthesia via laryngeal mask airway and was taken to the postanesthesia care unit in stable condition.  At the conclusion of the operation all sponge, needle, and instrument counts were reported correct x2 by the nursing staff.  Estimated blood loss was 15 mL.  The patient tolerated the procedure well with no immediate complications.  It is anticipated that the patient will be able to be discharged to home when PACU discharge criteria are met.

## 2025-01-08 NOTE — ANESTHESIA POSTPROCEDURE EVALUATION
Patient: Jenelle Morfin    Procedure Summary       Date: 01/08/25 Room / Location: [facility] ROOM 28 / SURGERY SAME DAY [facility]    Anesthesia Start: 1207 Anesthesia Stop: 1323    Procedure: LEFT BREAST EXCISIONAL BIOPSY X2 LESIONS (Left: Breast) Diagnosis: (Left Breast Atypical Ductal Hyperplasia)    Surgeons: Kathie Kruger M.D. Responsible Provider: Valerio Nettles M.D.    Anesthesia Type: general ASA Status: 3            Final Anesthesia Type: general  Last vitals  BP   Blood Pressure: 120/75    Temp   36.9 °C (98.4 °F)    Pulse   67   Resp   18    SpO2   100 %      Anesthesia Post Evaluation    Patient location during evaluation: PACU  Patient participation: complete - patient participated  Level of consciousness: awake and alert  Pain score: 2    Airway patency: patent  Anesthetic complications: no  Cardiovascular status: hemodynamically stable  Respiratory status: acceptable  Hydration status: euvolemic    PONV: none          There were no known notable events for this encounter.     Nurse Pain Score: 4 (NPRS)

## 2025-01-08 NOTE — ANESTHESIA PREPROCEDURE EVALUATION
Case: [id number] Date/Time: 01/08/25 1215    Procedure: LEFT BREAST EXCISIONAL BIOPSY X2 LESIONS    Pre-op diagnosis: BENIGN DYSPLASIA OF LEFT-N60.92    Location: [facility] ROOM 28 / SURGERY SAME DAY [facility]    Surgeons: Kathie Kruger M.D.            Relevant Problems   No relevant active problems       Physical Exam    Airway   Mallampati: II  TM distance: >3 FB  Neck ROM: full       Cardiovascular - normal exam  Rhythm: regular  Rate: normal  (-) murmur     Dental - normal exam           Pulmonary - normal exam  Breath sounds clear to auscultation     Abdominal    Neurological - normal exam                   Anesthesia Plan    ASA 3 (congenital heart disease)   ASA physical status 3 criteria: other (comment)    Plan - general       Airway plan will be LMA          Induction: intravenous    Postoperative Plan: Postoperative administration of opioids is intended.    Pertinent diagnostic labs and testing reviewed    Informed Consent:    Anesthetic plan and risks discussed with patient.    Use of blood products discussed with: patient whom consented to blood products.

## 2025-01-09 ENCOUNTER — TELEPHONE (OUTPATIENT)
Dept: SURGERY | Facility: MEDICAL CENTER | Age: 50
End: 2025-01-09
Payer: COMMERCIAL

## 2025-01-09 NOTE — TELEPHONE ENCOUNTER
Patient is doing well postoperatively with minimal pain, relieved with Tylenol and Ibuprofen. She denies any fevers, chills, sweats, N/V. I reminded her of her postop appointment scheduled for 01/14/2025 at 9:00am. I advised her to continue wearing her compression bra to prevent excess swelling or pain until her post op appointment. I informed her that she can shower beginning 48 hours after surgery and should continue daily showers thereafter. All patient questions answered.

## 2025-01-13 PROBLEM — Z91.89 AT HIGH RISK FOR BREAST CANCER: Status: ACTIVE | Noted: 2025-01-13

## 2025-01-13 NOTE — PROGRESS NOTES
"    Subjective    Delightful 49F s/p left breast excisional biopsy x2 2025, here for postop check and review of pathology.  Since surgery she reports some discomfort and bruising, but otherwise no acute issues.  Functionally, the patient is fatigued but walking regularly.    Pathology:  ADH and FEA at both sites.  The patient was given a printed copy of her pathology report and it was discussed with her in detail.    Objective    /87 (BP Location: Right arm, Patient Position: Sitting, BP Cuff Size: Large adult)   Pulse 64   Temp 36.2 °C (97.1 °F) (Temporal)   Ht 1.753 m (5' 9\")   Wt 73.9 kg (163 lb)   SpO2 98%   BMI 24.07 kg/m²   Gen: Alert, oriented, pleasant, no acute distress  Chest: Respirations unlabored on room air with symmetric expansion  Abd: Soft, nondistended  Ext: Warm, well-perfused  Breasts: Left breast UOQ radial incision clean, dry, intact.  No erythema.  No exudate.  No palpable masses.  No palpable fluctuance.  Moderate resolving ecchymosis.  Swollen contour.  Bedside ultrasound performed with the GE 12mHz linear probe confirming a small seroma at the surgical site.    [facility] [place]COG Performance Status categories: 1= Restricted in physically strenuous activity, but ambulatory and able to carry out work of a light sedentary nature, e.g., light housework, office work.    Assessment & Plan    Delightful 49F s/p left breast excisional biopsy x2 for ADH/FEA, overall doing well postoperatively.  We discussed her pathology results and the next steps in detail.  All questions were answered to the patient's satisfaction.  Seroma aspirated today.   - Referrals: Medical oncology to discuss chemoprevention   - Return to clinic: 1 week for wound check   - Next clinical breast exam: 2025, 2025, 2026   - Next breast imagin2025 bilateral mammogram, 2026 MRI    Problem   At High Risk for Breast Cancer    Lifetime (5-yr) breast cancer risk calculation:  AURELIA/Hemanth v8: 34.1% " (5.7%)    - Referral to medical oncology pending  - High risk surveillance     Atypical Ductal Hyperplasia of Left Breast    Left breast UOQ extensive calcifications  - Stereotactic CNBx x2 11/13/2024: Atypical ductal hyperplasia  - Left excisional biopsies (x2) 01/08/2025 (Jose)  - Pending referral to med onc for chemoprevention

## 2025-01-14 ENCOUNTER — OFFICE VISIT (OUTPATIENT)
Dept: SURGERY | Facility: MEDICAL CENTER | Age: 50
End: 2025-01-14
Payer: COMMERCIAL

## 2025-01-14 VITALS
BODY MASS INDEX: 24.14 KG/M2 | DIASTOLIC BLOOD PRESSURE: 87 MMHG | HEART RATE: 64 BPM | WEIGHT: 163 LBS | OXYGEN SATURATION: 98 % | SYSTOLIC BLOOD PRESSURE: 132 MMHG | HEIGHT: 69 IN | TEMPERATURE: 97.1 F

## 2025-01-14 DIAGNOSIS — Z91.89 AT HIGH RISK FOR BREAST CANCER: ICD-10-CM

## 2025-01-14 DIAGNOSIS — N60.92 ATYPICAL DUCTAL HYPERPLASIA OF LEFT BREAST: ICD-10-CM

## 2025-01-14 PROCEDURE — 3079F DIAST BP 80-89 MM HG: CPT | Performed by: SURGERY

## 2025-01-14 PROCEDURE — 10160 PNXR ASPIR ABSC HMTMA BULLA: CPT | Performed by: SURGERY

## 2025-01-14 PROCEDURE — 3075F SYST BP GE 130 - 139MM HG: CPT | Performed by: SURGERY

## 2025-01-14 PROCEDURE — 99024 POSTOP FOLLOW-UP VISIT: CPT | Performed by: SURGERY

## 2025-01-14 ASSESSMENT — FIBROSIS 4 INDEX: FIB4 SCORE: 0.7

## 2025-01-14 NOTE — PROCEDURES
The indications, alternatives, risks, and expected outcomes of seroma aspiration was held with the patient. All questions answered in detail. Verbal consent obtained. The left breast was prepped with alcohol. No local anaesthesia was needed due to the incision being mostly insensate. An 18g needle was used to aspirate 12 mL of serous fluid from the cavity, with complete collapse of the cavity, from a medial approach. A Band-Aid was applied. The patient tolerated the procedure well with no apparent complications. Estimated blood loss <2 mL.

## 2025-01-21 ENCOUNTER — OFFICE VISIT (OUTPATIENT)
Dept: SURGERY | Facility: MEDICAL CENTER | Age: 50
End: 2025-01-21
Payer: COMMERCIAL

## 2025-01-21 VITALS
BODY MASS INDEX: 25.31 KG/M2 | OXYGEN SATURATION: 97 % | SYSTOLIC BLOOD PRESSURE: 147 MMHG | HEIGHT: 68 IN | HEART RATE: 67 BPM | WEIGHT: 167 LBS | TEMPERATURE: 96.2 F | DIASTOLIC BLOOD PRESSURE: 93 MMHG

## 2025-01-21 DIAGNOSIS — N60.92 ATYPICAL DUCTAL HYPERPLASIA OF LEFT BREAST: ICD-10-CM

## 2025-01-21 PROCEDURE — 99024 POSTOP FOLLOW-UP VISIT: CPT | Performed by: SURGERY

## 2025-01-21 ASSESSMENT — FIBROSIS 4 INDEX: FIB4 SCORE: 0.7

## 2025-01-21 NOTE — PROGRESS NOTES
"    Subjective    Delightful 49F s/p left breast excisional biopsy x2 2025 for ADH, here for seroma check. She currently reports overall feeling much better than last week, with less swelling and heaviness.  She has backed off on the compression bras.    Objective    BP (!) 147/93 (BP Location: Right arm, Patient Position: Sitting, BP Cuff Size: Large adult)   Pulse 67   Temp (!) 35.7 °C (96.2 °F) (Temporal)   Ht 1.727 m (5' 8\")   Wt 75.8 kg (167 lb)   SpO2 97%   BMI 25.39 kg/m²   Gen: Alert, oriented, pleasant, no acute distress  Chest: Respirations unlabored on room air with symmetric expansion  Abd: Soft, nondistended  Ext: Warm, well-perfused  Breasts: Left breast UOQ radial incision clean, dry, intact.  No erythema.  No exudate.  No palpable masses.  No palpable fluctuance.  No ecchymosis.  Excellent contour. Healing ridge present throughout surgical site. Bedside ultrasound performed with the GE 12mHz linear probe showing a small seroma, smaller than last week.    [facility] ECOG Performance Status categories: 0= Fully active, able to carry on all pre-disease performance without restriction.    Assessment & Plan    Delightful 49F s/p left breast excisional biopsy x2 for ADH, overall doing well postoperatively.     - Referrals: No new referrals today (pending med onc eval for chemoprevention)  - Return to clinic: 1 week for repeat seroma check.  - Next clinical breast exam: 2025, 2025, 2026  - Next breast imagin2025 B dMMG, 2026 MRI    Problem   Atypical Ductal Hyperplasia of Left Breast    Left breast UOQ extensive calcifications  - Stereotactic CNBx x2 2024: Atypical ductal hyperplasia  - Left excisional biopsies (x2) 2025 (Jose)  - Pending referral to med onc for chemoprevention    - Seroma aspirated 2025       "

## 2025-01-28 ENCOUNTER — HOSPITAL ENCOUNTER (OUTPATIENT)
Dept: HEMATOLOGY ONCOLOGY | Facility: MEDICAL CENTER | Age: 50
End: 2025-01-28
Attending: NURSE PRACTITIONER
Payer: COMMERCIAL

## 2025-01-28 ENCOUNTER — OFFICE VISIT (OUTPATIENT)
Dept: SURGERY | Facility: MEDICAL CENTER | Age: 50
End: 2025-01-28
Payer: COMMERCIAL

## 2025-01-28 ENCOUNTER — RESEARCH ENCOUNTER (OUTPATIENT)
Dept: RESEARCH | Facility: MEDICAL CENTER | Age: 50
End: 2025-01-28

## 2025-01-28 VITALS
OXYGEN SATURATION: 97 % | HEART RATE: 75 BPM | BODY MASS INDEX: 24.96 KG/M2 | SYSTOLIC BLOOD PRESSURE: 118 MMHG | TEMPERATURE: 97.2 F | RESPIRATION RATE: 16 BRPM | DIASTOLIC BLOOD PRESSURE: 76 MMHG | WEIGHT: 164.68 LBS | HEIGHT: 68 IN

## 2025-01-28 VITALS
BODY MASS INDEX: 24.29 KG/M2 | HEIGHT: 69 IN | HEART RATE: 67 BPM | OXYGEN SATURATION: 96 % | WEIGHT: 164 LBS | TEMPERATURE: 96.7 F | DIASTOLIC BLOOD PRESSURE: 84 MMHG | SYSTOLIC BLOOD PRESSURE: 126 MMHG

## 2025-01-28 DIAGNOSIS — N60.92 ATYPICAL DUCTAL HYPERPLASIA OF LEFT BREAST: ICD-10-CM

## 2025-01-28 DIAGNOSIS — Z91.89 AT HIGH RISK FOR BREAST CANCER: ICD-10-CM

## 2025-01-28 PROCEDURE — 1125F AMNT PAIN NOTED PAIN PRSNT: CPT

## 2025-01-28 PROCEDURE — 3074F SYST BP LT 130 MM HG: CPT

## 2025-01-28 PROCEDURE — 99212 OFFICE O/P EST SF 10 MIN: CPT | Performed by: NURSE PRACTITIONER

## 2025-01-28 PROCEDURE — 99204 OFFICE O/P NEW MOD 45 MIN: CPT | Performed by: NURSE PRACTITIONER

## 2025-01-28 PROCEDURE — 99024 POSTOP FOLLOW-UP VISIT: CPT

## 2025-01-28 PROCEDURE — 3079F DIAST BP 80-89 MM HG: CPT

## 2025-01-28 RX ORDER — GABAPENTIN 300 MG/1
300 CAPSULE ORAL 3 TIMES DAILY
COMMUNITY
Start: 2022-11-07

## 2025-01-28 ASSESSMENT — ENCOUNTER SYMPTOMS
COUGH: 0
DIZZINESS: 0
DIARRHEA: 0
FEVER: 0
SHORTNESS OF BREATH: 0
CONSTIPATION: 0
WEIGHT LOSS: 0
VOMITING: 0
HEADACHES: 0
NAUSEA: 0
CHILLS: 0
PALPITATIONS: 0
DIAPHORESIS: 1

## 2025-01-28 ASSESSMENT — PAIN SCALES - GENERAL: PAINLEVEL_OUTOF10: 2=MINIMAL-SLIGHT

## 2025-01-28 ASSESSMENT — FIBROSIS 4 INDEX
FIB4 SCORE: 0.7
FIB4 SCORE: 0.7

## 2025-01-28 NOTE — RESEARCH NOTE
Patient has been referred by DONAVON GonzalezPDIVYA. The initial referral follow-up message, which includes the consent form link, has been sent to the patient.    Eligible Studies: HNP

## 2025-01-28 NOTE — PROGRESS NOTES
Subjective     Philomena Morfin is a 49 y.o. female who presents with New Patient (HRB/ Atypical ductal hyperplasia of left breast)          HPI    Referring Physician: Kathie Garcia MD  Primary Care:  David Ricci MD     Diagnosis: Atypical ductal hyperplasia     Chief Complaint: Patient seen today for for consultation for atypical ductal hyperplasia    Oncology history of presenting illness:  Patient underwent her screening mammogram on 10/9/2024 which did show left breast calcifications requesting further evaluation.  Diagnostic mammogram did show the indeterminate calcifications in the left breast which could be fibrocystic disease but stereotactic biopsy was recommended.  She underwent a stereotactic biopsy in 11/13/2024 of the left breast lateral and central calcifications.  The lateral calcifications did show small foci of atypical ductal hyperplasia present in 4 of the core fragments.  The central calcifications also showed small foci of atypical ductal hyperplasia and flat epithelial atypia and at least 4 of the core fragments.  The calcifications present atypical and not atypical elements.    Patient was referred to breast surgical team and established with Dr. Garcia.  She was sent for a breast MRI on 12/5/2024 which showed no evidence of malignancy.  She then underwent a left breast ultrasound-guided wire localization and excisional biopsy on 1/8/2025.  Pathology did show at the 12 o'clock position focal residual atypical ductal hyperplasia with calcifications.  There were nonatypical glandular elements.  At the 2 o'clock position there was foci of atypical ductal hyperplasia and flat epithelial atypia as well with calcifications.  The calcifications were and nonatypical glandular elements.    Patient is doing well post biopsy.  She does have some tenderness.  She did note that she remembers when she had mammogram completed in a [facility] back in 2018 in which she was told that she had  calcifications present at that time.    Patient stated that she has been experiencing ear pain.  She read that she thought this might be related to menopausal symptoms.  She also noted that she was having hot flashes at night approximately twice per month.  She started taking magnesium, and that has helped.  There was question whether she may also have trigeminal neuralgia and has been on gabapentin for this.    Patient does have a family history of cancer in her mother who was diagnosed with breast cancer at the age of 73.  She also has a maternal aunt who was diagnosed with lung cancer due to heavy smoking.    Patient is a never smoker.    Obstetrics:   Age of first birth: 34  Breast feed: Yes  Menarche age: 10  Birth control taken: OC use 13  Menopause age: N/A - ablation at age 36 for heavy bleeding - no menstrual cycle since then   HRT: No    MANFRED Model  5-yr risk: 4.7%  Lifetime risk: 34.5%    Yanique Reese Model:  Lifetime risk: 33%      Allergies   Allergen Reactions    Demerol Vomiting       Current Outpatient Medications on File Prior to Encounter   Medication Sig Dispense Refill    gabapentin (NEURONTIN) 300 MG Cap Take 300 mg by mouth 3 times a day.      B Complex Vitamins (VITAMIN B COMPLEX PO) Take  by mouth.      MAGNESIUM PO       baclofen (LIORESAL) 10 MG Tab Take 2 Tablets by mouth at bedtime. 60 Tablet 11    carbamazepine (CARBATROL) 300 MG CR capsule Take 1 Capsule by mouth in the morning, at noon, and at bedtime. 90 Capsule 11    Multiple Vitamins-Minerals (MULTIVITAMIN PO) Take 1 Tablet by mouth every day. Magnesium, Vitamin D (Patient taking differently: Take 1 Tablet by mouth every day. Magnesium, Vitamin D  Stopped for surgery)       No current facility-administered medications on file prior to encounter.       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;  Service: General    MI TOTAL HIP ARTHROPLASTY Right 04/12/2019    Procedure: ARTHROPLASTY, HIP, TOTAL;  Surgeon: Silvia Ann M.D.;  Location: SURGERY [facility];  Service: Orthopedics    CARDIOVASCULAR      cardiopulmonary atresia as an infant at day 3 and at 1 years of age.     ENDOMETRIAL ABLATION      HIP ARTHROSCOPY Right     OTHER CARDIAC SURGERY  1973 & 1975    Surgery at 4 days old to fix hole in heart and missing valve. Another Surgery in 1975 for valve growth       Family History   Problem Relation Age of Onset    Breast Cancer Mother 73        Breast cancer    Arthritis Mother         OA    Stroke Father         TIA     Arrythmia Father     Cancer Maternal Aunt         Lung d/t heavy smoking    Stroke Maternal Grandfather     Stroke Paternal Grandmother        Social History     Socioeconomic History    Marital status:    Tobacco Use    Smoking status: Never    Smokeless tobacco: Never   Vaping Use    Vaping status: Never Used   Substance and Sexual Activity    Alcohol use: Not Currently     Alcohol/week: 1.8 oz     Types: 1 Glasses of wine, 2 Standard drinks or equivalent per week     Comment: 2 per week    Drug use: Yes     Types: Inhaled     Comment: Marijuana    Sexual activity: Yes     Partners: Male     Birth control/protection: Male Sterilization         Review of Systems   Constitutional:  Positive for diaphoresis. Negative for chills, fever, malaise/fatigue and weight loss.   HENT:  Positive for ear pain.    Respiratory:   "Negative for cough and shortness of breath.    Cardiovascular:  Negative for chest pain and palpitations.   Gastrointestinal:  Negative for constipation, diarrhea, nausea and vomiting.   Genitourinary:  Negative for dysuria.   Neurological:  Negative for dizziness and headaches.              Objective     /76 (BP Location: Right arm, Patient Position: Sitting, BP Cuff Size: Adult)   Pulse 75   Temp 36.2 °C (97.2 °F) (Temporal)   Resp 16   Ht 1.727 m (5' 7.99\")   Wt 74.7 kg (164 lb 10.9 oz)   SpO2 97%   BMI 25.05 kg/m²      Physical Exam  Vitals reviewed.   Constitutional:       General: She is not in acute distress.     Appearance: Normal appearance. She is not diaphoretic.   Cardiovascular:      Rate and Rhythm: Normal rate.   Pulmonary:      Effort: Pulmonary effort is normal.   Musculoskeletal:         General: Normal range of motion.   Skin:     General: Skin is warm and dry.   Neurological:      Mental Status: She is alert and oriented to person, place, and time.   Psychiatric:         Mood and Affect: Mood normal.         Behavior: Behavior normal.            PATHOLOGY  FINAL DIAGNOSIS:   11/13/24  A. Left breast, lateral calcifications, stereotactic biopsy:          Small foci of atypical ductal hyperplasia (ADH) present in four           of the core fragments.          Calcifcations present in the ADH.   B. Left breast, central calcifications, stereotactic biopsy:          Small foci of ADH and flat epithelial atypia (FEA) present in at           least four of the core fragments.          Calcifications present the atypical and non-atypical elements.     Comment: The presence of atypical ductal hyperplasia in a core biopsy   can sometimes be associated with a higher grade lesion; therefore,   surgical consultation should be considered. The case is also reviewed   with Dr. Wyatt who concurs.       FINAL DIAGNOSIS:   01/08/25  A. Left breast, 12:00, excisional biopsy:          Focal residual " atypical ductal hyperplasia (ADH) with           calcifications.          Calcifications also present in nonatypical glandular elements.          Previous biopsy site changes identified (biopsy clip present on           specimen radiograph).   B. Left breast, 2:00, excisional biopsy:          Foci of atypical ductal hyperplasia and flat epithelial atypia           (FEA) with calcifications.          Calcifications also present in nonatypical glandular elements.          Previous biopsy site changes and biopsy clip identified.     Comment: The slides demonstrating the residual ADH and FEA are also   reviewed with Dr. Moore who concurs.       IMAGING  MR-BREAST-BILATERAL-WITH & W/O   12/05/24  IMPRESSION:     1.  No MRI evidence of malignancy bilaterally.  2.  Recommend surgical follow-up.     BI-RADS Category: R2 - CATEGORY 2: BENIGN FINDING(S)      MA-DIAGNOSTIC MAMMO LEFT W/O CAD   11/08/24  IMPRESSION:     1.  Indeterminate calcifications in the left breast could be related to fibrocystic disease though recommend stereotactic core biopsy of at least 2 groupings (most lateral and central groups).  2.  The patient met with  to discuss scheduling the biopsies.     These results were given to the patient at the time of visit.     BI-RADS Category: R4 - CATEGORY 4: SUSPICIOUS FINDING - (4B-FINDING NEEDING INTERVENTION WITH AN INTERMEDIATE SUSPICION FOR MALIGNANCY.)        MA-SCREENING MAMMO BILAT W/TOMOSYNTHESIS W/CAD   10/09/24  IMPRESSION:     Left breast calcifications. Recommend further evaluation with additional views.     R0 -  CATEGORY 0: NEED ADDITIONAL IMAGING EVALUATION AND/OR PRIOR MAMMOGRAM FOR COMPARISON         Assessment & Plan     1. Atypical ductal hyperplasia of left breast  Referral to Genetic Research Studies      2. At high risk for breast cancer  Referral to Genetic Research Studies              Patient with a recent pathology finding of atypical ductal hyperplasia.  Based  on these results I did discuss with patient the recommendations for continued management and monitoring/surveillance with this diagnosis of ADH.  Per NCCN guidelines the following recommendations are:     1.  Clinical encounters with follow-up and breast examination every 6-12 months to begin at the time of diagnosis.  Due in May 2025 per breast surgical team.     2.  Annual screening mammogram with tomosynthesis.  Due in November 2025 per breast surgical team.     3.  Consideration of an annual breast MRI with and without contrast.  Due in May 2026 per breast surgical team.     4.  Risk reduction strategies are recommended per NCCN guidelines.  Risk reducing agents is strongly recommended such as tamoxifen, raloxifene, or an aromatase inhibitor. According to NCCN guidelines for Tamoxifen 20 mg/day for 5 years was shown to reduce risk of breast cancer by 49%.  For individuals with a history of atypical hyperplasia this dose and duration was associated with an 86% reduction in breast cancer risk.  However low-dose tamoxifen 5 mg daily for 3 years is also an option if patient is symptomatic on the original dose or not willing or unable to take the standard dose tamoxifen.  Discussed this in detail with the patient today.    5. With Tamoxifen use, patient will need pelvic examination either with her PCP or gynecologist.  She will need to undergo thorough pelvic examination annually due to increased risk for endometrial cancer with Tamoxifen. Discussed this in detail with the patient today.  Patient did confirm that she recently had her pelvic exam back in October 2024.     6. Healthy lifestyle for breast cancer risk reduction recommendations: Alcohol consumption should be limited to no more than 1 drink per day, and no more than 3 drinks per week; 150-300 minutes of moderate-intensity per week; and weight control.        Plan:  At this time patient would like to have further discussion with her  and think about  the discussion that was had today.  Patient did state that she will reach out to me to let me know her decision as to whether she would like to proceed or not.  She is concerned about the symptoms from the tamoxifen especially with the joint pains.  She is thinking about reaching out to her orthopedic physician as well due to her previous histories of hip issues.    Discussed with the patient that she is interested in being tested for BRCA 1/2 mutation.  I went ahead and placed a referral to the [facility].      Please note that this dictation was created using voice recognition software. I have made every reasonable attempt to correct obvious errors, but I expect that there are errors of grammar and possibly content that I did not discover before finalizing the note.

## 2025-01-28 NOTE — PROGRESS NOTES
"    Subjective    Delightful 49F s/p L breast excisional biopsy x2 2025, here for seroma check.  She currently reports that the pain has improved since last visit and she does not believe she has a recurrent seroma. She has been wearing the compression sports bra diligently. Functionally, she is at baseline. She met with Dina Quesada for discussion of chemoprevention.     Objective    /84 (BP Location: Right arm, Patient Position: Sitting, BP Cuff Size: Large adult)   Pulse 67   Temp 35.9 °C (96.7 °F) (Temporal)   Ht 1.753 m (5' 9\")   Wt 74.4 kg (164 lb)   SpO2 96%   BMI 24.22 kg/m²   Gen: Alert, oriented, pleasant, no acute distress  Chest: Respirations unlabored on room air with symmetric expansion  Abd: Soft, nondistended  Ext: Warm, well-perfused  Breasts: Left breast UOQ radial incision clean, dry, intact.  No erythema.  No exudate.  No palpable masses.  No palpable fluctuance.  No ecchymosis.  Expected contour.    [facility] [place] ECOG Performance Status categories: 0= Fully active, able to carry on all pre-disease performance without restriction.    Assessment & Plan    Delightful 49F s/p L excisional biopsies x2 for ADH, overall doing better postoperatively. I advised her to begin gentle scar massage to help smooth out the contour along the incision and and use vitamin E oil to reduce scar discoloration. She can now wear whatever breast support is comfortable for her. All questions were answered to the patient's satisfaction.   - Referrals: None   - Return to clinic: 2025 or sooner if needed   - Next clinical breast exam: 2025, 2025, 2026   - Next breast imagin2025 Bilateral diagnostic mammograms, 2026 MRI    Problem   At High Risk for Breast Cancer    Lifetime (5-yr) breast cancer risk calculation:  AURELIA/Yanique-Mary Ann v8: 34.1% (5.7%)    - Referral to medical oncology pending  - High risk surveillance: Alternating MRI/MMG every 6 months     Atypical Ductal Hyperplasia of Left " Breast    Left breast UOQ extensive calcifications  - Stereotactic CNBx x2 11/13/2024: Atypical ductal hyperplasia  - Left excisional biopsies (x2) 01/08/2025 (Jose)    -Seroma aspirated 01/14/2025.   - Discussion of chemoprevention, pending decision ([place])           ----  This patient was seen, examined, and discussed with Dr. Jose Gomez MD, who agrees with the plan above.

## 2025-01-28 NOTE — Clinical Note
"     [facility]   [address],   [address]  [place] [address]  Phone: [phone]  Fax: [phone]              Jenelle Morfin  1975    Encounter Date: 2025    GERARD Gonzalez          PROGRESS NOTE:  Subjective    Philomena Morfin is a 49 y.o. female who presents with New Patient (HRB/ Atypical ductal hyperplasia of left breast)          HPI    Referring Physician: Kathie Garcia MD  Primary Care:  David Ricci MD     Diagnosis: Atypical ductal hyperplasia     Chief Complaint: Patient seen today for for consultation for atypical ductal hyperplasia    Oncology history of presenting illness:      Mammovan did note calicifications - 2018  Night sweats mainly - takes magnesium at night which does help some  Ear pain - quetioning trigeminal neuralgia  Pain since breast surgery      Obstetrics:   Age of first birth: 34  Breast feed: Yes  Menarche age: 10  Birth control taken: OC use 15  Menopause age: N/A - ablation at age 36 for heavy bleeding - no menstrual cycle since then   HRT: No    MANFRED Model  5-yr risk: 4.7%  Lifetime risk: 34.5%    Tyrer Cuzick Model:  Lifetime risk: 33%      Review of Systems   Constitutional:  Positive for diaphoresis. Negative for chills, fever, malaise/fatigue and weight loss.   HENT:  Positive for ear pain.    Respiratory:  Negative for cough and shortness of breath.    Cardiovascular:  Negative for chest pain and palpitations.   Gastrointestinal:  Negative for constipation, diarrhea, nausea and vomiting.   Genitourinary:  Negative for dysuria.   Neurological:  Negative for dizziness and headaches.              Objective    /76 (BP Location: Right arm, Patient Position: Sitting, BP Cuff Size: Adult)   Pulse 75   Temp 36.2 °C (97.2 °F) (Temporal)   Resp 16   Ht 1.727 m (5' 7.99\")   Wt 74.7 kg (164 lb 10.9 oz)   SpO2 97%   BMI 25.05 kg/m²      Physical Exam                        Assessment & Plan    1. Atypical ductal " hyperplasia of left breast        2. At high risk for breast cancer                Patient with a recent pathology finding of atypical ductal hyperplasia.  Based on these results I did discuss with patient the recommendations for continued management and monitoring/surveillance with this diagnosis of ADH.  Per NCCN guidelines the following recommendations are:     1.  Clinical encounters with follow-up and breast examination every 6-12 months to begin at the time of diagnosis     2.  Annual screening mammogram with tomosynthesis.     3.  Consideration of an annual breast MRI with and without contrast.       4.  Risk reduction strategies are recommended per NCCN guidelines.  Risk reducing agents is strongly recommended such as tamoxifen, raloxifene, or an aromatase inhibitor. According to NCCN guidelines for Tamoxifen 20 mg/day for 5 years was shown to reduce risk of breast cancer by 49%.  For individuals with a history of atypical hyperplasia this dose and duration was associated with an 86% reduction in breast cancer risk.  However low-dose tamoxifen 5 mg daily for 3 years is also an option if patient is symptomatic on the original dose or not willing or unable to take the standard dose tamoxifen.   4.  Risk reduction strategies are recommended per NCCN guidelines.  Risk reducing agents is strongly recommended such as tamoxifen, raloxifene, or an aromatase inhibitor. According to NCCN guidelines for Arimidex 1 mg/day was found to reduce the relative incidence of breast cancer by 53% with a median follow-up of 5 years.    5. With AI use, patient will need baseline DEXA scan. Recommendation for patient to be on calcium and vitamin D supplements daily (1200mg/800IU respectively at minimum) due to increased risk for osteoporosis. Discussed this in detail with the patient today.   5. With Tamoxifen use, patient will need pelvic examination either with her PCP or gynecologist.  She will need to undergo thorough pelvic  examination annually due to increased risk for endometrial cancer with Tamoxifen. Discussed this in detail with the patient today.      6. Healthy lifestyle for breast cancer risk reduction recommendations: Alcohol consumption should be limited to no more than 1 drink per day, and no more than 3 drinks per week; 150-300 minutes of moderate-intensity per week; and weight control.                David Ricci M.D.  [address]  [address]  [facility] [address]  Via Fax: [phone]

## 2025-01-30 ENCOUNTER — APPOINTMENT (OUTPATIENT)
Dept: SURGERY | Facility: MEDICAL CENTER | Age: 50
End: 2025-01-30
Payer: COMMERCIAL

## 2025-02-19 DIAGNOSIS — Z00.6 CLINICAL TRIAL PARTICIPANT: ICD-10-CM

## 2025-02-19 NOTE — RESEARCH NOTE
Patient has signed the consent form. The applicable research collection order(s) have been created.    Patient is ready for scheduling.

## 2025-04-07 DIAGNOSIS — G50.0 TRIGEMINAL NEURALGIA OF LEFT SIDE OF FACE: ICD-10-CM

## 2025-04-08 RX ORDER — CARBAMAZEPINE 300 MG/1
300 CAPSULE, EXTENDED RELEASE ORAL 3 TIMES DAILY
Qty: 90 CAPSULE | Refills: 2 | Status: SHIPPED | OUTPATIENT
Start: 2025-04-08

## 2025-04-09 ENCOUNTER — TELEPHONE (OUTPATIENT)
Dept: HEMATOLOGY ONCOLOGY | Facility: MEDICAL CENTER | Age: 50
End: 2025-04-09
Payer: COMMERCIAL

## 2025-04-09 NOTE — TELEPHONE ENCOUNTER
Patient has not scheduled her follow up sent to scheduling. Her prescription for her Carbazepine has  [employer] [employer]

## 2025-04-09 NOTE — TELEPHONE ENCOUNTER
Patient was seen in the high risk breast clinic for risk reduction medication discussion on 1/28/2025.  We discussed in detail the use of tamoxifen.  Discussed with patient via MyChart that she has chosen against proceeding with risk reduction medications but would like to continue with high risk surveillance.  She is established with the high risk breast clinic team/breast surgeons and is due for a follow-up visit in May 2025.  They have a high risk surveillance plan in place and will defer to them at this time.

## 2025-05-29 ENCOUNTER — OFFICE VISIT (OUTPATIENT)
Age: 50
End: 2025-05-29
Payer: COMMERCIAL

## 2025-05-29 VITALS
TEMPERATURE: 97.4 F | HEART RATE: 75 BPM | DIASTOLIC BLOOD PRESSURE: 84 MMHG | HEIGHT: 69 IN | OXYGEN SATURATION: 96 % | SYSTOLIC BLOOD PRESSURE: 126 MMHG | BODY MASS INDEX: 23.99 KG/M2 | WEIGHT: 162 LBS

## 2025-05-29 DIAGNOSIS — N95.1 MENOPAUSAL SYMPTOMS: ICD-10-CM

## 2025-05-29 DIAGNOSIS — Z91.89 AT HIGH RISK FOR BREAST CANCER: ICD-10-CM

## 2025-05-29 DIAGNOSIS — N60.92 ATYPICAL DUCTAL HYPERPLASIA OF LEFT BREAST: Primary | ICD-10-CM

## 2025-05-29 ASSESSMENT — ENCOUNTER SYMPTOMS
GASTROINTESTINAL NEGATIVE: 1
CARDIOVASCULAR NEGATIVE: 1
RESPIRATORY NEGATIVE: 1
EYES NEGATIVE: 1
NEUROLOGICAL NEGATIVE: 1
PSYCHIATRIC NEGATIVE: 1
CONSTITUTIONAL NEGATIVE: 1
MUSCULOSKELETAL NEGATIVE: 1

## 2025-05-29 ASSESSMENT — FIBROSIS 4 INDEX: FIB4 SCORE: 0.7

## 2025-05-29 NOTE — PROGRESS NOTES
"         SUBJECTIVE:   Jenelle Morfin is a delightful 49 y.o. female who presents for routine follow up visit and clinical breast exam. She is status post Left excisional biopsy (x2) for ADH on 01/08/2025. Currently, she reports that she has not breast concerns and that her left breast has softened since doing scar massage. She has been experiencing a lot of menopausal-like symptoms, particularly night sweats and weight gain, and would like some assistance with this. She has decided to decline chemoprevention after multiple discussions with medical oncology. There are no changes in her functional status and she is overall doing well.      Interval Imaging:  No new imaging.    Review of Systems   Constitutional: Negative.         Night sweats, hot flashes   HENT:  Positive for tinnitus.    Eyes: Negative.    Respiratory: Negative.     Cardiovascular: Negative.    Gastrointestinal: Negative.    Genitourinary: Negative.    Musculoskeletal: Negative.    Skin:  Positive for itching.   Neurological: Negative.    Endo/Heme/Allergies: Negative.    Psychiatric/Behavioral: Negative.          OBJECTIVE:  /84 (BP Location: Right arm, Patient Position: Sitting, BP Cuff Size: Large adult)   Pulse 75   Temp 36.3 °C (97.4 °F) (Temporal)   Ht 1.753 m (5' 9\")   Wt 73.5 kg (162 lb)   SpO2 96%   BMI 23.92 kg/m²    General: Alert, oriented, pleasant, no acute distress.  HEENT: Extraocular movements intact, no scleral icterus or conjunctival injection.  Lung: Respirations unlabored on room air. Lung sounds clear in all fields.  Cardio: Normal rate and rhythm. Heart sounds normal.  Abdomen: Soft, nondistended.  Extremities: Warm, well-perfused.  Breasts: Bilateral breasts examined in the upright and supine positions. No suspicious skin findings on either side (erythema, peau d'orange).  Well-healed Left breast UOQ radial incision. Bilateral breasts are heterogeneously dense without dominant masses or nodules. " Bilateral nipples everted without expressible discharge. No unexpected contour abnormalities.  No palpable cervical, supraclavicular, or axillary adenopathy.     FUNCTIONAL ASSESSMENT    Patient responses to questions:    Have you ever had shoulder surgery or been treated for a shoulder injury that resulted in a loss of range of motion?  No     Are you unable to reach into an upper cabinet and retrieve a cup or plate?  No     Do you have any limitations in daily activities because of your shoulder?  No     Overhead raise test for shoulder flexion:  Normal Range:  150-180 degrees    [facility] ECOG Performance Status categories: 0= Fully active, able to carry on all pre-disease performance without restriction.    ASSESSMENT AND PLAN:  Delightful 49 y.o. female, here for follow-up.   Currently she is doing well, without evidence of malignancy on my exam today. We have discussed the importance of self breast examination and to monitor for changes, such as breast pain, bloody nipple discharge, skin changes, masses and countour/nipple changes, as things can change between imaging intervals. If these are to occur, I advised her to notify our office immediately. For her menopausal symptoms, we discussed that there are some non-hormonal options that can be very helpful. I placed a referral to oncology wellness to assist with facilitation of this. All questions answered in detail.    Referrals:Oncology wellness  Return to office: 11/2025 or sooner if needed.  Next Clinical Breast Exam: 11/2025, 05/2026, 11/2026, 05/2027  Next Imaging: Bilateral diagnostic mammograms 11/2025 (ordered), MRI 05/2026    Problem   Menopausal Symptoms    Oncology wellness referral placed.     Atypical Ductal Hyperplasia of Left Breast    Left breast UOQ extensive calcifications  - Stereotactic CNBx x2 11/13/2024: Atypical ductal hyperplasia  - Left excisional biopsies (x2) 01/08/2025 ([place])    -Seroma aspirated 01/14/2025.   - Declined chemoprevention  ([place])             Cancer Staging   No matching staging information was found for the patient.       External imaging and reports were independently reviewed.  I spent 20 minutes today preparing to see the patient (including chart preparation and review), obtaining and reviewing additional medical history, performing a physical exam and evaluation, documenting clinical information in the electronic health record, independently interpreting results, communicating results to the patient, and coordinating care.     Shae Lagos PA-C

## 2025-05-30 ENCOUNTER — TELEPHONE (OUTPATIENT)
Age: 50
End: 2025-05-30
Payer: COMMERCIAL

## 2025-06-02 ENCOUNTER — TELEPHONE (OUTPATIENT)
Age: 50
End: 2025-06-02
Payer: COMMERCIAL

## 2025-06-02 NOTE — TELEPHONE ENCOUNTER
Spoke with Philomena and explained that the referral to Oncology Wellness must be placed by her PCP. I called her PCP's office and they are requesting an updated exam before they will place the order since they have not seen Philomena in a while. She is aware and will contact their office for this. All questions answered.

## 2025-06-09 ENCOUNTER — APPOINTMENT (OUTPATIENT)
Age: 50
End: 2025-06-09
Payer: COMMERCIAL

## 2025-06-09 ENCOUNTER — TELEPHONE (OUTPATIENT)
Age: 50
End: 2025-06-09

## 2025-06-09 NOTE — TELEPHONE ENCOUNTER
Spoke with Philomena in regards to her 6/9 new patient consult with Dr. Anastasia Alvarez. She stated that she spoke with our office on Friday to cancel the appt. Apologized for the miscommunication and canceled the appt. She informed me, she will have new insurance in July as well. She will call back once she is ready to reschedule.    Francisco

## 2025-07-08 DIAGNOSIS — G50.0 TRIGEMINAL NEURALGIA OF LEFT SIDE OF FACE: ICD-10-CM

## 2025-07-08 RX ORDER — CARBAMAZEPINE 300 MG/1
300 CAPSULE, EXTENDED RELEASE ORAL 3 TIMES DAILY
Qty: 90 CAPSULE | Refills: 0 | Status: SHIPPED | OUTPATIENT
Start: 2025-07-08

## 2025-07-08 NOTE — TELEPHONE ENCOUNTER
Received request via: Pharmacy    Medication Name/Dosage Carbamazepine 300 Mg    When was medication last prescribed 04/08/2025    How many refills were previously provided 2    How many Refills does he patient have left from last prescription 0    Was the patient seen in the last year in this department? Yes   Date of last office visit 09/27/2024    Per last Neurology Office Visit, when was the date of next follow up visit set for?                            Date of office visit follow up request 6 months     Does the patient have an upcoming appointment? No   If yes, when: Sent patient a message to schedule follow up             If no, schedule appointment --    Does the patient have assisted Plus and need 100 day supply (blood pressure, diabetes and cholesterol meds only)? Patient does not have SCP

## 2025-08-09 DIAGNOSIS — G50.0 TRIGEMINAL NEURALGIA OF LEFT SIDE OF FACE: ICD-10-CM

## 2025-08-12 ENCOUNTER — OFFICE VISIT (OUTPATIENT)
Age: 50
End: 2025-08-12
Payer: COMMERCIAL

## 2025-08-12 VITALS
SYSTOLIC BLOOD PRESSURE: 138 MMHG | BODY MASS INDEX: 24.13 KG/M2 | OXYGEN SATURATION: 98 % | HEART RATE: 73 BPM | RESPIRATION RATE: 18 BRPM | WEIGHT: 162.9 LBS | HEIGHT: 69 IN | DIASTOLIC BLOOD PRESSURE: 86 MMHG

## 2025-08-12 DIAGNOSIS — N95.1 MENOPAUSAL SYMPTOMS: ICD-10-CM

## 2025-08-12 DIAGNOSIS — H93.12 TINNITUS OF LEFT EAR: ICD-10-CM

## 2025-08-12 DIAGNOSIS — Z91.89 AT HIGH RISK FOR BREAST CANCER: ICD-10-CM

## 2025-08-12 DIAGNOSIS — N60.92 ATYPICAL DUCTAL HYPERPLASIA OF LEFT BREAST: Primary | ICD-10-CM

## 2025-08-12 RX ORDER — CARBAMAZEPINE 300 MG/1
CAPSULE, EXTENDED RELEASE ORAL
Qty: 90 CAPSULE | Refills: 0 | Status: SHIPPED | OUTPATIENT
Start: 2025-08-12

## 2025-08-12 ASSESSMENT — FIBROSIS 4 INDEX: FIB4 SCORE: 0.7

## 2025-08-12 ASSESSMENT — PAIN SCALES - GENERAL: PAINLEVEL_OUTOF10: NO PAIN

## 2025-08-22 DIAGNOSIS — G50.0 TRIGEMINAL NEURALGIA OF LEFT SIDE OF FACE: ICD-10-CM

## 2025-08-22 RX ORDER — BACLOFEN 10 MG/1
20 TABLET ORAL
Qty: 60 TABLET | Refills: 0 | OUTPATIENT
Start: 2025-08-22

## (undated) DEVICE — DRAPE MAGNETIC (INSTRA-MAG) - (30/CA)

## (undated) DEVICE — SUTURE 1 PDS-2 PLUS CTX - (24/BX)

## (undated) DEVICE — GLOVE BIOGEL PI INDICATOR SZ 7.5 SURGICAL PF LF -(50/BX 4BX/CA)

## (undated) DEVICE — TUBE E-T HI-LO CUFF 7.0MM (10EA/PK)

## (undated) DEVICE — SUTURE 3-0 MONOCRYL PLUS PS-1 - 27 INCH (36/BX)

## (undated) DEVICE — GLOVE BIOGEL INDICATOR SZ 7.5 SURGICAL PF LTX - (50PR/BX 4BX/CA)

## (undated) DEVICE — TIP INTPLS HFLO ML ORFC BTRY - (12/CS)  FOR SURGILAV

## (undated) DEVICE — TUBING CLEARLINK DUO-VENT - C-FLO (48EA/CA)

## (undated) DEVICE — SUTURE GENERAL

## (undated) DEVICE — SENSOR SPO2 NEO LNCS ADHESIVE (20/BX) SEE USER NOTES

## (undated) DEVICE — MIXER BONE CEMENT REVOLUTION - W/FEMORAL PRESSURIZER (6/CA)

## (undated) DEVICE — SUTURE QUILL 2 PDO - (12/BX)

## (undated) DEVICE — CANISTER SUCTION RIGID RED 1500CC (40EA/CA)

## (undated) DEVICE — MASK ANESTHESIA ADULT  - (100/CA)

## (undated) DEVICE — SENSOR OXIMETER ADULT SPO2 RD SET (20EA/BX)

## (undated) DEVICE — GLOVE BIOGEL SZ 6 PF LATEX - (50EA/BX 4BX/CA)

## (undated) DEVICE — PACK TOTAL HIP - (1/CA)

## (undated) DEVICE — HEAD HOLDER JUNIOR/ADULT

## (undated) DEVICE — SLEEVE, VASO, THIGH, MED

## (undated) DEVICE — DRAPE LARGE 3 QUARTER - (20/CA)

## (undated) DEVICE — GLOVE SZ 7.5 BIOGEL PI MICRO - PF LF (50PR/BX)

## (undated) DEVICE — CHLORAPREP 3 ML APPLICATOR - (25/BX 4BX/CS 100/CS)

## (undated) DEVICE — TUBE CONNECTING SUCTION - CLEAR PLASTIC STERILE 72 IN (50EA/CA)

## (undated) DEVICE — SOD. CHL. INJ. 0.9% 1000 ML - (14EA/CA 60CA/PF)

## (undated) DEVICE — PEN SKIN MARKER W/RULER - (50EA/BX)

## (undated) DEVICE — GLOVE BIOGEL ECLIPSE PF LATEX SIZE 8.0  (50PR/BX)

## (undated) DEVICE — ELECTRODE 850 FOAM ADHESIVE - HYDROGEL RADIOTRNSPRNT (50/PK)

## (undated) DEVICE — GLOVE BIOGEL PI INDICATOR SZ 6.0 SURGICAL PF LF -(200PR/CA)

## (undated) DEVICE — BLADE SAGITTAL SAW DUAL CUT 75.0 X 25.0MM (1/EA)

## (undated) DEVICE — CANNULA O2 COMFORT SOFT EAR ADULT 7 FT TUBING (50/CA)

## (undated) DEVICE — GLOVE BIOGEL PI ORTHO SZ 7 PF LF (40PR/BX)

## (undated) DEVICE — GAUZE FLUFF STERILE 2-PLY 36 X 36 (100EA/CA)

## (undated) DEVICE — SUTURE 3-0 MONOCRYL PLUS PS-2 - (12/BX)

## (undated) DEVICE — ELECTRODE DUAL RETURN W/ CORD - (50/PK)

## (undated) DEVICE — BLADE SURGICAL CLIPPER - (50EA/CA)

## (undated) DEVICE — DRESSING POST OP BORDER 4 X 10 (5EA/BX)

## (undated) DEVICE — GLOVE BIOGEL PI INDICATOR SZ 6.5 SURGICAL PF LF - (50/BX 4BX/CA)

## (undated) DEVICE — CLOSURE SKIN STRIP 1/2 X 4 IN - (STERI STRIP) (50/BX 4BX/CA)

## (undated) DEVICE — CHLORAPREP 26 ML APPLICATOR - ORANGE TINT(25/CA)

## (undated) DEVICE — SUCTION INSTRUMENT YANKAUER BULBOUS TIP W/O VENT (50EA/CA)

## (undated) DEVICE — SET LEADWIRE 5 LEAD BEDSIDE DISPOSABLE ECG (1SET OF 5/EA)

## (undated) DEVICE — SUTURE 1 VICRYL PLUS CTX - 8 X 18 INCH (12/BX)

## (undated) DEVICE — LACTATED RINGERS INJ 1000 ML - (14EA/CA 60CA/PF)

## (undated) DEVICE — COVER LIGHT HANDLE FLEXIBLE - SOFT (2EA/PK 80PK/CA)

## (undated) DEVICE — MASK OXYGEN VNYL ADLT MED CONC WITH 7 FOOT TUBING - (50EA/CA)

## (undated) DEVICE — GLOVE BIOGEL SZ 7 SURGICAL PF LTX - (50PR/BX 4BX/CA)

## (undated) DEVICE — NEEDLE LOC 5CM 20GA KOPAN BREAST (10EA/BX)

## (undated) DEVICE — WATER IRRIG. STER. 1500 ML - (9/CA)

## (undated) DEVICE — CANISTER SUCTION 3000ML MECHANICAL FILTER AUTO SHUTOFF MEDI-VAC NONSTERILE LF DISP  (40EA/CA)

## (undated) DEVICE — KIT ROOM DECONTAMINATION

## (undated) DEVICE — KIT ANESTHESIA W/CIRCUIT & 3/LT BAG W/FILTER (20EA/CA)

## (undated) DEVICE — GLOVE BIOGEL SZ 7.5 SURGICAL PF LTX - (50PR/BX 4BX/CA)

## (undated) DEVICE — PROTECTOR ULNA NERVE - (36PR/CA)

## (undated) DEVICE — PACK BREAST RECONSTRUCTION (2EA/CA)

## (undated) DEVICE — GOWN WARMING STANDARD FLEX - (30/CA)

## (undated) DEVICE — HANDPIECE 10FT INTPLS SCT PLS IRRIGATION HAND CONTROL SET (6/PK)

## (undated) DEVICE — TOWEL STOP TIMEOUT SAFETY FLAG (40EA/CA)

## (undated) DEVICE — SUTURE 2-0 VICRYL PLUS SH - 27 INCH (36/BX)

## (undated) DEVICE — SUTURE 4-0 MONOCRYL PLUS PS-2 - 27 INCH (36/BX)

## (undated) DEVICE — SET EXTENSION WITH 2 PORTS (48EA/CA) ***PART #2C8610 IS A SUBSTITUTE*****

## (undated) DEVICE — LENS/HOOD FOR SPACESUIT - (32/PK) PEEL AWAY FACE

## (undated) DEVICE — PLUMEPEN ULTRA 3/8 IN X 10 FT HOSE (20EA/CA)

## (undated) DEVICE — PAD UNIVERSAL MULTI USE (1/EA)

## (undated) DEVICE — SET FLUID WARMING STANDARD FLOW - (10/CA)

## (undated) DEVICE — SLEEVE VASO DVT COMPRESSION CALF MED - (10PR/CA)

## (undated) DEVICE — BRUSH FMCNL TIP IRR STRL - (12/PKG) FOR SURGILAV

## (undated) DEVICE — MARKER CORRECT CLIPS (1EA) - MIN ORDER OF 24 EA MUST BE INCREMENTS OF 24

## (undated) DEVICE — SUTURE 2-0 VICRYL PLUS CT-1 - 8 X 18 INCH(12/BX)

## (undated) DEVICE — MASK AIRWAY SIZE 3 UNIQUE SILICON (10/BX)

## (undated) DEVICE — KIT  I.V. START (100EA/CA)

## (undated) DEVICE — SODIUM CHL IRRIGATION 0.9% 1000ML (12EA/CA)

## (undated) DEVICE — CANISTER SUCTION 3000ML MECHANICAL FILTER AUTO SHUTOFF MEDI-VAC NONSTERILE LF DISP (40EA/CA)

## (undated) DEVICE — NEPTUNE 4 PORT MANIFOLD - (20/PK)

## (undated) DEVICE — DRAPE STRLE REG TOWEL 18X24 - (10/BX 4BX/CA)"